# Patient Record
Sex: MALE | Race: WHITE | Employment: FULL TIME | URBAN - METROPOLITAN AREA
[De-identification: names, ages, dates, MRNs, and addresses within clinical notes are randomized per-mention and may not be internally consistent; named-entity substitution may affect disease eponyms.]

---

## 2017-01-13 RX ORDER — METOPROLOL SUCCINATE 25 MG/1
TABLET, EXTENDED RELEASE ORAL
Qty: 90 TABLET | Refills: 0 | Status: SHIPPED | OUTPATIENT
Start: 2017-01-13 | End: 2017-04-25 | Stop reason: SDUPTHER

## 2017-01-25 RX ORDER — LISINOPRIL AND HYDROCHLOROTHIAZIDE 20; 12.5 MG/1; MG/1
TABLET ORAL
Qty: 90 TABLET | Refills: 0 | OUTPATIENT
Start: 2017-01-25

## 2017-01-26 RX ORDER — LISINOPRIL AND HYDROCHLOROTHIAZIDE 20; 12.5 MG/1; MG/1
TABLET ORAL
Qty: 90 TABLET | Refills: 0 | Status: SHIPPED | OUTPATIENT
Start: 2017-01-26 | End: 2017-04-25

## 2017-04-25 ENCOUNTER — OFFICE VISIT (OUTPATIENT)
Dept: INTERNAL MEDICINE CLINIC | Age: 42
End: 2017-04-25

## 2017-04-25 VITALS
SYSTOLIC BLOOD PRESSURE: 118 MMHG | HEIGHT: 74 IN | BODY MASS INDEX: 22.59 KG/M2 | HEART RATE: 72 BPM | WEIGHT: 176 LBS | DIASTOLIC BLOOD PRESSURE: 80 MMHG

## 2017-04-25 DIAGNOSIS — I10 ESSENTIAL HYPERTENSION: Primary | ICD-10-CM

## 2017-04-25 PROCEDURE — 99213 OFFICE O/P EST LOW 20 MIN: CPT | Performed by: INTERNAL MEDICINE

## 2017-04-25 RX ORDER — METOPROLOL SUCCINATE 25 MG/1
TABLET, EXTENDED RELEASE ORAL
Qty: 90 TABLET | Refills: 0 | Status: SHIPPED | OUTPATIENT
Start: 2017-04-25 | End: 2017-08-04 | Stop reason: SDUPTHER

## 2017-04-25 ASSESSMENT — ENCOUNTER SYMPTOMS
CHEST TIGHTNESS: 0
VOMITING: 0
COUGH: 0
ABDOMINAL PAIN: 0
DIARRHEA: 0
BLOOD IN STOOL: 0
NAUSEA: 0
SHORTNESS OF BREATH: 0
WHEEZING: 0

## 2017-08-04 RX ORDER — METOPROLOL SUCCINATE 25 MG/1
TABLET, EXTENDED RELEASE ORAL
Qty: 90 TABLET | Refills: 0 | Status: SHIPPED | OUTPATIENT
Start: 2017-08-04 | End: 2018-05-14

## 2017-11-02 ENCOUNTER — OFFICE VISIT (OUTPATIENT)
Dept: INTERNAL MEDICINE CLINIC | Age: 42
End: 2017-11-02

## 2017-11-02 VITALS
BODY MASS INDEX: 21.94 KG/M2 | DIASTOLIC BLOOD PRESSURE: 82 MMHG | WEIGHT: 171 LBS | SYSTOLIC BLOOD PRESSURE: 136 MMHG | HEIGHT: 74 IN | HEART RATE: 76 BPM

## 2017-11-02 DIAGNOSIS — I10 ESSENTIAL HYPERTENSION: Primary | ICD-10-CM

## 2017-11-02 DIAGNOSIS — I10 ESSENTIAL HYPERTENSION: ICD-10-CM

## 2017-11-02 DIAGNOSIS — Z23 NEED FOR INFLUENZA VACCINATION: ICD-10-CM

## 2017-11-02 LAB
BASOPHILS ABSOLUTE: 0.1 K/UL (ref 0–0.2)
BASOPHILS RELATIVE PERCENT: 1.1 %
EOSINOPHILS ABSOLUTE: 0.1 K/UL (ref 0–0.6)
EOSINOPHILS RELATIVE PERCENT: 1.1 %
HCT VFR BLD CALC: 43.3 % (ref 40.5–52.5)
HEMOGLOBIN: 15.2 G/DL (ref 13.5–17.5)
LYMPHOCYTES ABSOLUTE: 1.2 K/UL (ref 1–5.1)
LYMPHOCYTES RELATIVE PERCENT: 18.4 %
MCH RBC QN AUTO: 38.6 PG (ref 26–34)
MCHC RBC AUTO-ENTMCNC: 35.1 G/DL (ref 31–36)
MCV RBC AUTO: 109.9 FL (ref 80–100)
MONOCYTES ABSOLUTE: 0.8 K/UL (ref 0–1.3)
MONOCYTES RELATIVE PERCENT: 11.6 %
NEUTROPHILS ABSOLUTE: 4.4 K/UL (ref 1.7–7.7)
NEUTROPHILS RELATIVE PERCENT: 67.8 %
PDW BLD-RTO: 13.2 % (ref 12.4–15.4)
PLATELET # BLD: 157 K/UL (ref 135–450)
PMV BLD AUTO: 9.5 FL (ref 5–10.5)
RBC # BLD: 3.94 M/UL (ref 4.2–5.9)
WBC # BLD: 6.5 K/UL (ref 4–11)

## 2017-11-02 PROCEDURE — 90658 IIV3 VACCINE SPLT 0.5 ML IM: CPT | Performed by: INTERNAL MEDICINE

## 2017-11-02 PROCEDURE — 90471 IMMUNIZATION ADMIN: CPT | Performed by: INTERNAL MEDICINE

## 2017-11-02 PROCEDURE — 99213 OFFICE O/P EST LOW 20 MIN: CPT | Performed by: INTERNAL MEDICINE

## 2017-11-02 RX ORDER — METOPROLOL SUCCINATE 25 MG/1
TABLET, EXTENDED RELEASE ORAL
Qty: 90 TABLET | Refills: 1 | Status: SHIPPED | OUTPATIENT
Start: 2017-11-02 | End: 2018-05-11 | Stop reason: SDUPTHER

## 2017-11-02 ASSESSMENT — ENCOUNTER SYMPTOMS
NAUSEA: 0
BLOOD IN STOOL: 0
SHORTNESS OF BREATH: 0
CHEST TIGHTNESS: 0
DIARRHEA: 0
VOMITING: 0
WHEEZING: 0
COUGH: 0
ABDOMINAL PAIN: 0

## 2017-11-03 LAB
ANION GAP SERPL CALCULATED.3IONS-SCNC: 18 MMOL/L (ref 3–16)
BUN BLDV-MCNC: 10 MG/DL (ref 7–20)
CALCIUM SERPL-MCNC: 9.9 MG/DL (ref 8.3–10.6)
CHLORIDE BLD-SCNC: 98 MMOL/L (ref 99–110)
CO2: 26 MMOL/L (ref 21–32)
CREAT SERPL-MCNC: 0.9 MG/DL (ref 0.9–1.3)
GFR AFRICAN AMERICAN: >60
GFR NON-AFRICAN AMERICAN: >60
GLUCOSE BLD-MCNC: 98 MG/DL (ref 70–99)
POTASSIUM SERPL-SCNC: 4.1 MMOL/L (ref 3.5–5.1)
SODIUM BLD-SCNC: 142 MMOL/L (ref 136–145)

## 2018-02-07 ENCOUNTER — OFFICE VISIT (OUTPATIENT)
Dept: INTERNAL MEDICINE CLINIC | Age: 43
End: 2018-02-07

## 2018-02-07 VITALS
HEIGHT: 74 IN | DIASTOLIC BLOOD PRESSURE: 80 MMHG | WEIGHT: 180 LBS | BODY MASS INDEX: 23.1 KG/M2 | SYSTOLIC BLOOD PRESSURE: 142 MMHG | HEART RATE: 76 BPM

## 2018-02-07 DIAGNOSIS — N52.9 ERECTILE DYSFUNCTION, UNSPECIFIED ERECTILE DYSFUNCTION TYPE: ICD-10-CM

## 2018-02-07 DIAGNOSIS — F17.218 CIGARETTE NICOTINE DEPENDENCE WITH OTHER NICOTINE-INDUCED DISORDER: ICD-10-CM

## 2018-02-07 DIAGNOSIS — I10 ESSENTIAL HYPERTENSION: Primary | ICD-10-CM

## 2018-02-07 PROCEDURE — 99213 OFFICE O/P EST LOW 20 MIN: CPT | Performed by: INTERNAL MEDICINE

## 2018-02-07 RX ORDER — SILDENAFIL 100 MG/1
100 TABLET, FILM COATED ORAL PRN
Qty: 6 TABLET | Refills: 2 | Status: SHIPPED | OUTPATIENT
Start: 2018-02-07 | End: 2018-06-15

## 2018-02-07 ASSESSMENT — ENCOUNTER SYMPTOMS
CHEST TIGHTNESS: 0
SHORTNESS OF BREATH: 0
NAUSEA: 0
ABDOMINAL PAIN: 0
WHEEZING: 0
DIARRHEA: 0
BLOOD IN STOOL: 0
VOMITING: 0
COUGH: 0

## 2018-02-21 RX ORDER — SILDENAFIL CITRATE 20 MG/1
20 TABLET ORAL PRN
Qty: 6 TABLET | Refills: 2 | Status: SHIPPED | OUTPATIENT
Start: 2018-02-21 | End: 2018-06-15

## 2018-05-14 RX ORDER — METOPROLOL SUCCINATE 25 MG/1
TABLET, EXTENDED RELEASE ORAL
Qty: 90 TABLET | Refills: 0 | Status: SHIPPED | OUTPATIENT
Start: 2018-05-14 | End: 2018-06-15 | Stop reason: SDUPTHER

## 2018-06-15 ENCOUNTER — OFFICE VISIT (OUTPATIENT)
Dept: INTERNAL MEDICINE CLINIC | Age: 43
End: 2018-06-15

## 2018-06-15 VITALS
HEART RATE: 100 BPM | HEIGHT: 74 IN | WEIGHT: 177 LBS | BODY MASS INDEX: 22.72 KG/M2 | DIASTOLIC BLOOD PRESSURE: 88 MMHG | SYSTOLIC BLOOD PRESSURE: 166 MMHG

## 2018-06-15 DIAGNOSIS — I10 ESSENTIAL HYPERTENSION: Primary | ICD-10-CM

## 2018-06-15 PROCEDURE — 99213 OFFICE O/P EST LOW 20 MIN: CPT | Performed by: INTERNAL MEDICINE

## 2018-06-15 RX ORDER — SILDENAFIL CITRATE 20 MG/1
20 TABLET ORAL PRN
Qty: 20 TABLET | Refills: 0 | Status: SHIPPED | OUTPATIENT
Start: 2018-06-15 | End: 2019-01-17 | Stop reason: SDUPTHER

## 2018-06-15 RX ORDER — METOPROLOL SUCCINATE 50 MG/1
TABLET, EXTENDED RELEASE ORAL
Qty: 30 TABLET | Refills: 2 | Status: SHIPPED | OUTPATIENT
Start: 2018-06-15 | End: 2019-01-08 | Stop reason: SDUPTHER

## 2018-06-15 ASSESSMENT — ENCOUNTER SYMPTOMS
BLOOD IN STOOL: 0
VOMITING: 0
CHEST TIGHTNESS: 0
DIARRHEA: 0
WHEEZING: 0
NAUSEA: 0
SHORTNESS OF BREATH: 0
ABDOMINAL PAIN: 0
COUGH: 0

## 2018-07-02 ENCOUNTER — OFFICE VISIT (OUTPATIENT)
Dept: INTERNAL MEDICINE CLINIC | Age: 43
End: 2018-07-02

## 2018-07-02 VITALS
WEIGHT: 182 LBS | BODY MASS INDEX: 23.36 KG/M2 | HEIGHT: 74 IN | DIASTOLIC BLOOD PRESSURE: 90 MMHG | SYSTOLIC BLOOD PRESSURE: 142 MMHG | HEART RATE: 88 BPM

## 2018-07-02 DIAGNOSIS — R59.0 CERVICAL LYMPHADENOPATHY: Primary | ICD-10-CM

## 2018-07-02 DIAGNOSIS — I10 ESSENTIAL HYPERTENSION: ICD-10-CM

## 2018-07-02 DIAGNOSIS — Z01.818 PRE-OPERATIVE GENERAL PHYSICAL EXAMINATION: ICD-10-CM

## 2018-07-02 DIAGNOSIS — C10.9 OROPHARYNGEAL CANCER (HCC): ICD-10-CM

## 2018-07-02 PROCEDURE — 99243 OFF/OP CNSLTJ NEW/EST LOW 30: CPT | Performed by: INTERNAL MEDICINE

## 2018-07-02 NOTE — PROGRESS NOTES
Subjective: Yoan Merrill is a 37 y.o. male who presents to the office today for a preoperative consultation at the request of surgeon Dr. Teresita Araujo  who plans on performing right cervical LN biopsy . Planned anesthesia is general.       Past Medical History:   Diagnosis Date    Cancer ([address]     lymphoma    Hypertension     Neoplasm, soft palate      History reviewed. No pertinent surgical history. Social History     Social History    Marital status: Single     Spouse name: N/A    Number of children: N/A    Years of education: N/A     Social History Main Topics    Smoking status: Current Every Day Smoker     Packs/day: 1.00     Years: 15.00     Types: Cigarettes    Smokeless tobacco: Never Used    Alcohol use 2.4 oz/week     4 Cans of beer per week      Comment: daily    Drug use: No    Sexual activity: Not Asked     Other Topics Concern    None     Social History Narrative    None     Current Outpatient Prescriptions   Medication Sig Dispense Refill    metoprolol succinate (TOPROL XL) 50 MG extended release tablet TAKE ONE TABLET BY MOUTH DAILY 30 tablet 2    sildenafil (REVATIO) 20 MG tablet Take 1 tablet by mouth as needed (ED) 20 tablet 0    varenicline (CHANTIX STARTING MONTH PAK) 0.5 MG X 11 & 1 MG X 42 tablet Take by mouth. 1 each 2     No current facility-administered medications for this visit.       No Known Allergies  Review of Systems  Constitutional: negative for fatigue and fevers  Eyes: negative for icterus and irritation  Ears, nose, mouth, throat, and face: negative for nasal congestion  Respiratory: negative for cough, shortness of breath and wheezing  Cardiovascular: negative for chest pain, irregular heart beat and palpitations  Gastrointestinal: negative for abdominal pain, diarrhea, nausea and vomiting  Genitourinary:negative for dysuria and hematuria     Planned anesthesia: general  Known anesthesia problems: none  Bleeding risk:  low  Personal or FH of DVT/PE: no  Patient objection to receiving blood products: no     Objective:      BP (!) 142/90   Pulse 88   Ht 6' 2\" (1.88 m)   Wt 182 lb (82.6 kg)   BMI 23.37 kg/m²     General Appearance:  Alert, cooperative, no distress, appears stated age   Head:  Normocephalic, without obvious abnormality, atraumatic   Eyes:  PERRL, conjunctiva/corneas clear, EOM's intact   Ears:  Normal TM's and external ear canals, both ears       Throat: Lips, mucosa, and tongue normal; teeth and gums normal   Neck: Supple, symmetrical, trachea midline, no adenopathy, thyroid: not enlarged, symmetric, no tenderness/mass/nodules, no carotid bruit or JVD       Lungs:   Clear to auscultation bilaterally, respirations unlabored   Chest Wall:  No tenderness or deformity   Heart:  Regular rate and rhythm, S1, S2 normal, no murmur, rub or gallop   Abdomen:   Soft, non-tender, bowel sounds active all four quadrants,  no masses, no organomegaly           Extremities: Extremities normal, atraumatic, no cyanosis or edema   Pulses: 2+ and symmetric   Skin: Skin color, texture, turgor normal, no rashes or lesions                        Assessment:       Diagnosis Orders   1. Cervical lymphadenopathy     2. Oropharyngeal cancer ([address]     3. Pre-operative general physical examination     4. Essential hypertension           37 y.o. male with planned surgery as above. Plan:   HTN. Stable. Continue toprol. Patient medically cleared for above planned procedure.

## 2018-07-10 LAB
ALT SERPL-CCNC: 12 U/L (ref 0–60)
AST SERPL-CCNC: 20 U/L (ref 0–40)
HEPATITIS B CORE IGM ANTIBODY: NONREACTIVE
HEPATITIS B SURFACE ANTIGEN: NONREACTIVE
HEPATITIS C GENOTYPE: NONREACTIVE
HEPATITIS C VIRUS AB SIGNAL/CU: 0.15 S/CO (ref 0–0.79)
HIV-1 AND HIV-2 ANTIBODIES: NONREACTIVE

## 2018-07-13 ENCOUNTER — TELEPHONE (OUTPATIENT)
Dept: INTERNAL MEDICINE CLINIC | Age: 43
End: 2018-07-13

## 2018-07-13 NOTE — TELEPHONE ENCOUNTER
----- Message from Jaime Louis MD sent at 7/13/2018  3:44 PM EDT -----  He should atleast take the 25 mg one daily   ----- Message -----  From: Ilya Plummer  Sent: 7/13/2018   3:28 PM  To: Jaime Louis MD    You upped  His bp med. He stopped smokin 1 week ago and bp has been ranging around 123/76.   Do you still want him to take his pills, he hasnt taken any for 3 days

## 2019-01-09 RX ORDER — METOPROLOL SUCCINATE 50 MG/1
TABLET, EXTENDED RELEASE ORAL
Qty: 30 TABLET | Refills: 0 | Status: SHIPPED | OUTPATIENT
Start: 2019-01-09 | End: 2019-02-27 | Stop reason: SDUPTHER

## 2019-01-17 ENCOUNTER — OFFICE VISIT (OUTPATIENT)
Dept: INTERNAL MEDICINE CLINIC | Age: 44
End: 2019-01-17

## 2019-01-17 VITALS
HEART RATE: 96 BPM | SYSTOLIC BLOOD PRESSURE: 122 MMHG | HEIGHT: 75 IN | WEIGHT: 192 LBS | BODY MASS INDEX: 23.87 KG/M2 | DIASTOLIC BLOOD PRESSURE: 80 MMHG

## 2019-01-17 DIAGNOSIS — I10 ESSENTIAL HYPERTENSION: Primary | ICD-10-CM

## 2019-01-17 PROCEDURE — 99213 OFFICE O/P EST LOW 20 MIN: CPT | Performed by: INTERNAL MEDICINE

## 2019-01-17 RX ORDER — SILDENAFIL CITRATE 20 MG/1
20 TABLET ORAL PRN
Qty: 20 TABLET | Refills: 2 | Status: SHIPPED | OUTPATIENT
Start: 2019-01-17 | End: 2019-10-18 | Stop reason: SDUPTHER

## 2019-01-17 ASSESSMENT — PATIENT HEALTH QUESTIONNAIRE - PHQ9
2. FEELING DOWN, DEPRESSED OR HOPELESS: 0
SUM OF ALL RESPONSES TO PHQ QUESTIONS 1-9: 0
SUM OF ALL RESPONSES TO PHQ9 QUESTIONS 1 & 2: 0
1. LITTLE INTEREST OR PLEASURE IN DOING THINGS: 0
SUM OF ALL RESPONSES TO PHQ QUESTIONS 1-9: 0

## 2019-01-17 ASSESSMENT — ENCOUNTER SYMPTOMS
WHEEZING: 0
BLOOD IN STOOL: 0
CHEST TIGHTNESS: 0
NAUSEA: 0
COUGH: 0
VOMITING: 0
DIARRHEA: 0
SHORTNESS OF BREATH: 0
ABDOMINAL PAIN: 0

## 2019-02-28 RX ORDER — METOPROLOL SUCCINATE 50 MG/1
TABLET, EXTENDED RELEASE ORAL
Qty: 30 TABLET | Refills: 2 | Status: SHIPPED | OUTPATIENT
Start: 2019-02-28 | End: 2019-08-26 | Stop reason: SDUPTHER

## 2019-08-27 RX ORDER — METOPROLOL SUCCINATE 50 MG/1
TABLET, EXTENDED RELEASE ORAL
Qty: 30 TABLET | Refills: 2 | Status: SHIPPED | OUTPATIENT
Start: 2019-08-27 | End: 2019-10-18 | Stop reason: SDUPTHER

## 2019-10-18 ENCOUNTER — OFFICE VISIT (OUTPATIENT)
Dept: INTERNAL MEDICINE CLINIC | Age: 44
End: 2019-10-18

## 2019-10-18 VITALS
WEIGHT: 186 LBS | HEART RATE: 88 BPM | HEIGHT: 74 IN | DIASTOLIC BLOOD PRESSURE: 90 MMHG | SYSTOLIC BLOOD PRESSURE: 138 MMHG | BODY MASS INDEX: 23.87 KG/M2

## 2019-10-18 DIAGNOSIS — I10 ESSENTIAL HYPERTENSION: Primary | ICD-10-CM

## 2019-10-18 DIAGNOSIS — I10 ESSENTIAL HYPERTENSION: ICD-10-CM

## 2019-10-18 DIAGNOSIS — Z23 NEED FOR INFLUENZA VACCINATION: ICD-10-CM

## 2019-10-18 LAB
ANION GAP SERPL CALCULATED.3IONS-SCNC: 14 MMOL/L (ref 3–16)
BASOPHILS ABSOLUTE: 0.1 K/UL (ref 0–0.2)
BASOPHILS RELATIVE PERCENT: 1.1 %
BUN BLDV-MCNC: 13 MG/DL (ref 7–20)
CALCIUM SERPL-MCNC: 9.5 MG/DL (ref 8.3–10.6)
CHLORIDE BLD-SCNC: 101 MMOL/L (ref 99–110)
CO2: 23 MMOL/L (ref 21–32)
CREAT SERPL-MCNC: 1 MG/DL (ref 0.9–1.3)
EOSINOPHILS ABSOLUTE: 0.1 K/UL (ref 0–0.6)
EOSINOPHILS RELATIVE PERCENT: 1.9 %
GFR AFRICAN AMERICAN: >60
GFR NON-AFRICAN AMERICAN: >60
GLUCOSE BLD-MCNC: 96 MG/DL (ref 70–99)
HCT VFR BLD CALC: 43 % (ref 40.5–52.5)
HEMOGLOBIN: 15.1 G/DL (ref 13.5–17.5)
LYMPHOCYTES ABSOLUTE: 1.4 K/UL (ref 1–5.1)
LYMPHOCYTES RELATIVE PERCENT: 20.3 %
MCH RBC QN AUTO: 37.4 PG (ref 26–34)
MCHC RBC AUTO-ENTMCNC: 35.1 G/DL (ref 31–36)
MCV RBC AUTO: 106.3 FL (ref 80–100)
MONOCYTES ABSOLUTE: 0.9 K/UL (ref 0–1.3)
MONOCYTES RELATIVE PERCENT: 13.4 %
NEUTROPHILS ABSOLUTE: 4.5 K/UL (ref 1.7–7.7)
NEUTROPHILS RELATIVE PERCENT: 63.3 %
PDW BLD-RTO: 13.1 % (ref 12.4–15.4)
PLATELET # BLD: 172 K/UL (ref 135–450)
PMV BLD AUTO: 9.2 FL (ref 5–10.5)
POTASSIUM SERPL-SCNC: 3.9 MMOL/L (ref 3.5–5.1)
RBC # BLD: 4.04 M/UL (ref 4.2–5.9)
SODIUM BLD-SCNC: 138 MMOL/L (ref 136–145)
WBC # BLD: 7 K/UL (ref 4–11)

## 2019-10-18 PROCEDURE — 99213 OFFICE O/P EST LOW 20 MIN: CPT | Performed by: INTERNAL MEDICINE

## 2019-10-18 PROCEDURE — 90471 IMMUNIZATION ADMIN: CPT | Performed by: INTERNAL MEDICINE

## 2019-10-18 PROCEDURE — 90682 RIV4 VACC RECOMBINANT DNA IM: CPT | Performed by: INTERNAL MEDICINE

## 2019-10-18 RX ORDER — METOPROLOL SUCCINATE 50 MG/1
TABLET, EXTENDED RELEASE ORAL
Qty: 30 TABLET | Refills: 2 | Status: CANCELLED | OUTPATIENT
Start: 2019-10-18

## 2019-10-18 RX ORDER — METOPROLOL SUCCINATE 50 MG/1
TABLET, EXTENDED RELEASE ORAL
Qty: 30 TABLET | Refills: 2 | Status: SHIPPED | OUTPATIENT
Start: 2019-10-18 | End: 2020-05-14 | Stop reason: SDUPTHER

## 2019-10-18 RX ORDER — SILDENAFIL CITRATE 20 MG/1
20 TABLET ORAL PRN
Qty: 20 TABLET | Refills: 2 | Status: CANCELLED | OUTPATIENT
Start: 2019-10-18

## 2019-10-18 RX ORDER — VARENICLINE TARTRATE 1 MG/1
1 TABLET, FILM COATED ORAL 2 TIMES DAILY
Qty: 60 TABLET | Refills: 1 | Status: SHIPPED | OUTPATIENT
Start: 2019-10-18 | End: 2021-05-06

## 2019-10-18 RX ORDER — VARENICLINE TARTRATE 25 MG
KIT ORAL
Qty: 1 BOX | Refills: 0 | Status: SHIPPED | OUTPATIENT
Start: 2019-10-18 | End: 2021-05-06

## 2019-10-18 RX ORDER — SILDENAFIL CITRATE 20 MG/1
20 TABLET ORAL PRN
Qty: 20 TABLET | Refills: 2 | Status: SHIPPED | OUTPATIENT
Start: 2019-10-18 | End: 2020-11-20 | Stop reason: SDUPTHER

## 2019-10-18 ASSESSMENT — ENCOUNTER SYMPTOMS
BLOOD IN STOOL: 0
COUGH: 0
SHORTNESS OF BREATH: 0
NAUSEA: 0
VOMITING: 0
CHEST TIGHTNESS: 0
DIARRHEA: 0
ABDOMINAL PAIN: 0
WHEEZING: 0

## 2020-05-14 ENCOUNTER — VIRTUAL VISIT (OUTPATIENT)
Dept: INTERNAL MEDICINE CLINIC | Age: 45
End: 2020-05-14

## 2020-05-14 PROCEDURE — 99213 OFFICE O/P EST LOW 20 MIN: CPT | Performed by: INTERNAL MEDICINE

## 2020-05-14 RX ORDER — METOPROLOL SUCCINATE 50 MG/1
TABLET, EXTENDED RELEASE ORAL
Qty: 30 TABLET | Refills: 2 | Status: SHIPPED | OUTPATIENT
Start: 2020-05-14 | End: 2020-08-20 | Stop reason: SDUPTHER

## 2020-05-14 ASSESSMENT — ENCOUNTER SYMPTOMS
SHORTNESS OF BREATH: 0
COUGH: 0
WHEEZING: 0
CHEST TIGHTNESS: 0
DIARRHEA: 0
BLOOD IN STOOL: 0
NAUSEA: 0
ABDOMINAL PAIN: 0
VOMITING: 0

## 2020-05-14 NOTE — PROGRESS NOTES
person/place/time [x]Able to follow commands                ASSESSMENT/PLAN:   Diagnosis Orders   1. Essential hypertension           HTN    Continue toprol XL 50 mg for now     Counselled to stop smoking. chantix   Stop alcohol. No follow-ups on file. Bina Acuna is a 53 y.o. male being evaluated by a Virtual Visit (video visit) encounter to address concerns as mentioned above. A caregiver was present when appropriate. Due to this being a TeleHealth encounter ([facility]), evaluation of the following organ systems was limited: Vitals/Constitutional/EENT/Resp/CV/GI//MS/Neuro/Skin/Heme-Lymph-Imm. Pursuant to the emergency declaration under the [address] [place] [address] authority and the [employer] and [employer] Act, this Virtual Visit was conducted with patient's (and/or legal guardian's) consent, to reduce the patient's risk of exposure to COVID-19 and provide necessary medical care. The patient (and/or legal guardian) has also been advised to contact this office for worsening conditions or problems, and seek emergency medical treatment and/or call 911 if deemed necessary. Patient identification was verified at the start of the visit: Yes    Total time spent on this encounter: Not billed by time    Services were provided through a video synchronous discussion virtually to substitute for in-person clinic visit. Patient and provider were located at their individual homes. --Mary Bonner MD on 5/14/2020 at 2:01 PM    An electronic signature was used to authenticate this note.

## 2020-08-20 RX ORDER — METOPROLOL SUCCINATE 50 MG/1
TABLET, EXTENDED RELEASE ORAL
Qty: 30 TABLET | Refills: 2 | Status: SHIPPED | OUTPATIENT
Start: 2020-08-20 | End: 2020-11-20 | Stop reason: SDUPTHER

## 2020-09-21 ENCOUNTER — VIRTUAL VISIT (OUTPATIENT)
Dept: INTERNAL MEDICINE CLINIC | Age: 45
End: 2020-09-21

## 2020-09-21 ASSESSMENT — ENCOUNTER SYMPTOMS
ABDOMINAL PAIN: 0
CHEST TIGHTNESS: 0
WHEEZING: 0
BLOOD IN STOOL: 0
NAUSEA: 0
DIARRHEA: 0
COUGH: 0
SHORTNESS OF BREATH: 0
VOMITING: 0

## 2020-09-21 NOTE — PROGRESS NOTES
2020    TELEHEALTH EVALUATION -- Audio/Visual (During [facility]- public health emergency)    HPI:    Karly Jc (:  1975) has requested an audio/video evaluation for the following concern(s):    Is here for follow up of HTN. Has been complaint with meds. Trying to cut down on tobacco and alcohol.     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 Negative for fatigue and fever. Respiratory: Negative for cough, chest tightness, shortness of breath and wheezing. Cardiovascular: Negative for chest pain and palpitations. Gastrointestinal: Negative for abdominal pain, blood in stool, diarrhea, nausea and vomiting. Prior to Visit Medications    Medication Sig Taking? Authorizing Provider   metoprolol succinate (TOPROL XL) 50 MG extended release tablet TAKE ONE TABLET BY MOUTH DAILY  Rod Garcia MD   sildenafil (REVATIO) 20 MG tablet Take 1 tablet by mouth as needed (ED)  Rod Garcia MD   varenicline (CHANTIX STARTING MONTH ) 0.5 MG X 11 & 1 MG X 42 tablet Take by mouth. Rod Garcia MD   varenicline (CHANTIX CONTINUING MONTH ) 1 MG tablet Take 1 tablet by mouth 2 times daily  Rod Garcia MD   varenicline (CHANTIX STARTING MONTH ) 0.5 MG X 11 & 1 MG X 42 tablet Take by mouth. Rod Garcia MD       Social History     Tobacco Use    Smoking status: Current Every Day Smoker     Packs/day: 1.00     Years: 15.00     Pack years: 15.00     Types: Cigarettes    Smokeless tobacco: Never Used   Substance Use Topics    Alcohol use: Yes     Alcohol/week: 4.0 standard drinks     Types: 4 Cans of beer per week     Comment: daily    Drug use:  No            PHYSICAL EXAMINATION:  Vital Signs: (As obtained by patient/caregiver or practitioner observation)      Constitutional: [x] Appears well-developed and well-nourished [] No apparent distress      [x] Abnormal-   Mental status  [x] Alert and awake  [x] Oriented to person/place/time []Able to follow commands        Pulmonary/Chest: [x] Respiratory effort normal.  [x] No visualized signs of difficulty breathing or respiratory distress                        Psychiatric:       [x] Normal Affect [x] No Hallucinations        [] Abnormal-     Other pertinent observable physical exam findings-     ASSESSMENT/PLAN:   Diagnosis Orders   1. Essential hypertension       HTN   stable   Continue toprol XL 50 mg for now     Counselled to stop smoking. using nicotine gum  Counseled to stop drinking  alcohol. No follow-ups on file. Tere Lao is a 50 y.o. male being evaluated by a Virtual Visit (video visit) encounter to address concerns as mentioned above. A caregiver was present when appropriate. Due to this being a TeleHealth encounter (During [facility]-93 public health emergency), evaluation of the following organ systems was limited: Vitals/Constitutional/EENT/Resp/CV/GI//MS/Neuro/Skin/Heme-Lymph-Imm. Pursuant to the emergency declaration under the [address] [place] [address] authority and the [place] and [employer] Act, this Virtual Visit was conducted with patient's (and/or legal guardian's) consent, to reduce the patient's risk of exposure to COVID-19 and provide necessary medical care. The patient (and/or legal guardian) has also been advised to contact this office for worsening conditions or problems, and seek emergency medical treatment and/or call 911 if deemed necessary. Patient identification was verified at the start of the visit: Yes    Total time spent on this encounter: Not billed by time    Services were provided through a video synchronous discussion virtually to substitute for in-person clinic visit. Patient and provider were located at their individual homes.     --Carol Ann Aggarwal MD on 9/21/2020 at 4:43 PM    An electronic signature was used to authenticate this note.

## 2020-11-20 RX ORDER — SILDENAFIL CITRATE 20 MG/1
20 TABLET ORAL PRN
Qty: 20 TABLET | Refills: 2 | Status: SHIPPED | OUTPATIENT
Start: 2020-11-20 | End: 2020-11-23

## 2020-11-20 RX ORDER — METOPROLOL SUCCINATE 50 MG/1
TABLET, EXTENDED RELEASE ORAL
Qty: 90 TABLET | Refills: 0 | Status: SHIPPED | OUTPATIENT
Start: 2020-11-20 | End: 2020-11-23

## 2021-02-24 ENCOUNTER — TELEPHONE (OUTPATIENT)
Dept: INTERNAL MEDICINE CLINIC | Age: 46
End: 2021-02-24

## 2021-02-24 RX ORDER — METOPROLOL SUCCINATE 50 MG/1
TABLET, EXTENDED RELEASE ORAL
Qty: 30 TABLET | Refills: 2 | Status: SHIPPED | OUTPATIENT
Start: 2021-02-24 | End: 2021-05-06 | Stop reason: SDUPTHER

## 2021-05-06 ENCOUNTER — OFFICE VISIT (OUTPATIENT)
Dept: INTERNAL MEDICINE CLINIC | Age: 46
End: 2021-05-06

## 2021-05-06 VITALS
TEMPERATURE: 98.5 F | SYSTOLIC BLOOD PRESSURE: 154 MMHG | DIASTOLIC BLOOD PRESSURE: 90 MMHG | WEIGHT: 184 LBS | HEART RATE: 96 BPM | BODY MASS INDEX: 23.61 KG/M2 | HEIGHT: 74 IN

## 2021-05-06 DIAGNOSIS — I10 ESSENTIAL HYPERTENSION: ICD-10-CM

## 2021-05-06 DIAGNOSIS — Z00.00 ROUTINE GENERAL MEDICAL EXAMINATION AT A HEALTH CARE FACILITY: Primary | ICD-10-CM

## 2021-05-06 PROCEDURE — 99396 PREV VISIT EST AGE 40-64: CPT | Performed by: INTERNAL MEDICINE

## 2021-05-06 RX ORDER — METOPROLOL SUCCINATE 50 MG/1
TABLET, EXTENDED RELEASE ORAL
Qty: 30 TABLET | Refills: 5 | Status: SHIPPED | OUTPATIENT
Start: 2021-05-06 | End: 2021-11-29

## 2021-05-06 ASSESSMENT — ENCOUNTER SYMPTOMS
VOMITING: 0
BLOOD IN STOOL: 0
NAUSEA: 0
SHORTNESS OF BREATH: 0
DIARRHEA: 0
COUGH: 0
ABDOMINAL PAIN: 0
CHEST TIGHTNESS: 0
WHEEZING: 0

## 2021-05-06 ASSESSMENT — PATIENT HEALTH QUESTIONNAIRE - PHQ9
SUM OF ALL RESPONSES TO PHQ9 QUESTIONS 1 & 2: 0
SUM OF ALL RESPONSES TO PHQ QUESTIONS 1-9: 0
SUM OF ALL RESPONSES TO PHQ QUESTIONS 1-9: 0
2. FEELING DOWN, DEPRESSED OR HOPELESS: 0

## 2021-05-06 NOTE — PROGRESS NOTES
Galen Pang (:  1975) is a 50 y.o. male,Established patient, here for evaluation of the following chief complaint(s): Annual Exam      ASSESSMENT/PLAN:   Diagnosis Orders   1. Routine general medical examination at a health care facility  Hemoglobin A1C    Comprehensive Metabolic Panel    CBC Auto Differential    Lipid Panel   2. Essential hypertension  Comprehensive Metabolic Panel       Physical done    HTN  bp is high today    bp is good at home  Continue toprol XL 50 mg for now     Counselled to stop smoking. using nicotine gum  Counseled to stop drinking  alcohol. Advised to follow up at [facility] for oral cancer     SUBJECTIVE/OBJECTIVE:  HPI  Is here for a physcial  Is here for follow up of HTN. Has been complaint with meds. Trying to cut down on tobacco and alcohol.     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 Negative for fatigue and fever. Respiratory: Negative for cough, chest tightness, shortness of breath and wheezing. Cardiovascular: Negative for chest pain and palpitations. Gastrointestinal: Negative for abdominal pain, blood in stool, diarrhea, nausea and vomiting. Genitourinary: Negative for dysuria and hematuria. Neurological: Negative for light-headedness and headaches. Physical Exam  Constitutional:       Appearance: He is well-developed. HENT:      Head: Normocephalic and atraumatic. Right Ear: Tympanic membrane, ear canal and external ear normal. There is no impacted cerumen. Left Ear: Tympanic membrane, ear canal and external ear normal. There is no impacted cerumen. Eyes:      Pupils: Pupils are equal, round, and reactive to light. Neck:      Musculoskeletal: Normal range of motion and neck supple. Thyroid: No thyromegaly. Cardiovascular:      Rate and Rhythm: Normal rate and regular rhythm. Heart sounds: Normal heart sounds. No murmur.  No friction rub. No gallop. Comments: No carotid bruit  Pulmonary:      Effort: Pulmonary effort is normal. No respiratory distress. Breath sounds: Normal breath sounds. No wheezing or rales. Chest:      Chest wall: No tenderness. Abdominal:      General: Bowel sounds are normal. There is no distension. Palpations: Abdomen is soft. There is no mass. Tenderness: There is no abdominal tenderness. There is no guarding or rebound. Neurological:      Mental Status: He is alert and oriented to person, place, and time. Psychiatric:         Mood and Affect: Mood normal.         Behavior: Behavior normal.         Thought Content: Thought content normal.         Judgment: Judgment normal.                 An electronic signature was used to authenticate this note.     --Wale Snowden MD

## 2021-05-07 ENCOUNTER — IMMUNIZATION (OUTPATIENT)
Dept: PRIMARY CARE CLINIC | Age: 46
End: 2021-05-07
Payer: COMMERCIAL

## 2021-05-07 DIAGNOSIS — Z00.00 ROUTINE GENERAL MEDICAL EXAMINATION AT A HEALTH CARE FACILITY: ICD-10-CM

## 2021-05-07 DIAGNOSIS — I10 ESSENTIAL HYPERTENSION: ICD-10-CM

## 2021-05-07 LAB
A/G RATIO: 1.5 (ref 1.1–2.2)
ALBUMIN SERPL-MCNC: 4.6 G/DL (ref 3.4–5)
ALP BLD-CCNC: 76 U/L (ref 40–129)
ALT SERPL-CCNC: 12 U/L (ref 10–40)
ANION GAP SERPL CALCULATED.3IONS-SCNC: 13 MMOL/L (ref 3–16)
AST SERPL-CCNC: 22 U/L (ref 15–37)
BASOPHILS ABSOLUTE: 0.1 K/UL (ref 0–0.2)
BASOPHILS RELATIVE PERCENT: 0.8 %
BILIRUB SERPL-MCNC: 0.6 MG/DL (ref 0–1)
BUN BLDV-MCNC: 14 MG/DL (ref 7–20)
CALCIUM SERPL-MCNC: 9.5 MG/DL (ref 8.3–10.6)
CHLORIDE BLD-SCNC: 95 MMOL/L (ref 99–110)
CHOLESTEROL, TOTAL: 197 MG/DL (ref 0–199)
CO2: 26 MMOL/L (ref 21–32)
CREAT SERPL-MCNC: 1.1 MG/DL (ref 0.9–1.3)
EOSINOPHILS ABSOLUTE: 0.1 K/UL (ref 0–0.6)
EOSINOPHILS RELATIVE PERCENT: 1.2 %
GFR AFRICAN AMERICAN: >60
GFR NON-AFRICAN AMERICAN: >60
GLOBULIN: 3 G/DL
GLUCOSE BLD-MCNC: 88 MG/DL (ref 70–99)
HCT VFR BLD CALC: 48.5 % (ref 40.5–52.5)
HDLC SERPL-MCNC: 87 MG/DL (ref 40–60)
HEMOGLOBIN: 16.9 G/DL (ref 13.5–17.5)
LDL CHOLESTEROL CALCULATED: 94 MG/DL
LYMPHOCYTES ABSOLUTE: 1.6 K/UL (ref 1–5.1)
LYMPHOCYTES RELATIVE PERCENT: 18.2 %
MCH RBC QN AUTO: 36.8 PG (ref 26–34)
MCHC RBC AUTO-ENTMCNC: 34.8 G/DL (ref 31–36)
MCV RBC AUTO: 105.6 FL (ref 80–100)
MONOCYTES ABSOLUTE: 1 K/UL (ref 0–1.3)
MONOCYTES RELATIVE PERCENT: 11 %
NEUTROPHILS ABSOLUTE: 6.2 K/UL (ref 1.7–7.7)
NEUTROPHILS RELATIVE PERCENT: 68.8 %
PDW BLD-RTO: 13.4 % (ref 12.4–15.4)
PLATELET # BLD: 183 K/UL (ref 135–450)
PMV BLD AUTO: 9.4 FL (ref 5–10.5)
POTASSIUM SERPL-SCNC: 4.3 MMOL/L (ref 3.5–5.1)
RBC # BLD: 4.59 M/UL (ref 4.2–5.9)
SODIUM BLD-SCNC: 134 MMOL/L (ref 136–145)
TOTAL PROTEIN: 7.6 G/DL (ref 6.4–8.2)
TRIGL SERPL-MCNC: 81 MG/DL (ref 0–150)
VLDLC SERPL CALC-MCNC: 16 MG/DL
WBC # BLD: 9 K/UL (ref 4–11)

## 2021-05-07 PROCEDURE — 91300 COVID-19, PFIZER VACCINE 30MCG/0.3ML DOSE: CPT | Performed by: FAMILY MEDICINE

## 2021-05-07 PROCEDURE — 0001A COVID-19, PFIZER VACCINE 30MCG/0.3ML DOSE: CPT | Performed by: FAMILY MEDICINE

## 2021-05-08 LAB
ESTIMATED AVERAGE GLUCOSE: 102.5 MG/DL
HBA1C MFR BLD: 5.2 %

## 2021-05-28 ENCOUNTER — IMMUNIZATION (OUTPATIENT)
Dept: PRIMARY CARE CLINIC | Age: 46
End: 2021-05-28
Payer: COMMERCIAL

## 2021-05-28 PROCEDURE — 0002A COVID-19, PFIZER VACCINE 30MCG/0.3ML DOSE: CPT | Performed by: FAMILY MEDICINE

## 2021-05-28 PROCEDURE — 91300 COVID-19, PFIZER VACCINE 30MCG/0.3ML DOSE: CPT | Performed by: FAMILY MEDICINE

## 2021-11-29 RX ORDER — METOPROLOL SUCCINATE 50 MG/1
TABLET, EXTENDED RELEASE ORAL
Qty: 30 TABLET | Refills: 0 | Status: SHIPPED | OUTPATIENT
Start: 2021-11-29 | End: 2021-12-30

## 2021-12-27 RX ORDER — SILDENAFIL CITRATE 20 MG/1
TABLET ORAL
Qty: 20 TABLET | Refills: 2 | Status: SHIPPED | OUTPATIENT
Start: 2021-12-27 | End: 2022-09-27 | Stop reason: SDUPTHER

## 2021-12-30 RX ORDER — METOPROLOL SUCCINATE 50 MG/1
TABLET, EXTENDED RELEASE ORAL
Qty: 30 TABLET | Refills: 0 | Status: SHIPPED | OUTPATIENT
Start: 2021-12-30 | End: 2022-01-31

## 2022-01-12 ENCOUNTER — OFFICE VISIT (OUTPATIENT)
Dept: INTERNAL MEDICINE CLINIC | Age: 47
End: 2022-01-12

## 2022-01-12 VITALS
HEIGHT: 74 IN | SYSTOLIC BLOOD PRESSURE: 158 MMHG | DIASTOLIC BLOOD PRESSURE: 88 MMHG | HEART RATE: 92 BPM | WEIGHT: 190 LBS | BODY MASS INDEX: 24.38 KG/M2

## 2022-01-12 DIAGNOSIS — I10 ESSENTIAL HYPERTENSION: Primary | ICD-10-CM

## 2022-01-12 PROCEDURE — 99213 OFFICE O/P EST LOW 20 MIN: CPT | Performed by: INTERNAL MEDICINE

## 2022-01-12 ASSESSMENT — ENCOUNTER SYMPTOMS
COUGH: 0
NAUSEA: 0
WHEEZING: 0
DIARRHEA: 0
ABDOMINAL PAIN: 0
SHORTNESS OF BREATH: 0
CHEST TIGHTNESS: 0
VOMITING: 0
BLOOD IN STOOL: 0

## 2022-08-08 RX ORDER — METOPROLOL SUCCINATE 50 MG/1
TABLET, EXTENDED RELEASE ORAL
Qty: 30 TABLET | Refills: 2 | Status: SHIPPED | OUTPATIENT
Start: 2022-08-08

## 2022-09-27 ENCOUNTER — OFFICE VISIT (OUTPATIENT)
Dept: INTERNAL MEDICINE CLINIC | Age: 47
End: 2022-09-27

## 2022-09-27 VITALS
BODY MASS INDEX: 23.74 KG/M2 | DIASTOLIC BLOOD PRESSURE: 92 MMHG | HEIGHT: 74 IN | SYSTOLIC BLOOD PRESSURE: 158 MMHG | WEIGHT: 185 LBS | HEART RATE: 84 BPM

## 2022-09-27 DIAGNOSIS — Z00.00 ENCOUNTER FOR WELL ADULT EXAM WITHOUT ABNORMAL FINDINGS: Primary | ICD-10-CM

## 2022-09-27 DIAGNOSIS — C85.80 DIFFUSE NON-HODGKIN'S LYMPHOMA (HCC): ICD-10-CM

## 2022-09-27 DIAGNOSIS — Z00.00 ROUTINE GENERAL MEDICAL EXAMINATION AT A HEALTH CARE FACILITY: ICD-10-CM

## 2022-09-27 DIAGNOSIS — I10 ESSENTIAL HYPERTENSION: ICD-10-CM

## 2022-09-27 PROCEDURE — 99396 PREV VISIT EST AGE 40-64: CPT | Performed by: INTERNAL MEDICINE

## 2022-09-27 RX ORDER — SILDENAFIL CITRATE 20 MG/1
TABLET ORAL
Qty: 20 TABLET | Refills: 5 | Status: SHIPPED | OUTPATIENT
Start: 2022-09-27

## 2022-09-27 ASSESSMENT — ENCOUNTER SYMPTOMS
BLOOD IN STOOL: 0
SHORTNESS OF BREATH: 0
VOMITING: 0
NAUSEA: 0
DIARRHEA: 0
WHEEZING: 0
ABDOMINAL PAIN: 0
CHEST TIGHTNESS: 0
COUGH: 0

## 2022-09-27 ASSESSMENT — PATIENT HEALTH QUESTIONNAIRE - PHQ9
SUM OF ALL RESPONSES TO PHQ QUESTIONS 1-9: 0
SUM OF ALL RESPONSES TO PHQ QUESTIONS 1-9: 0
SUM OF ALL RESPONSES TO PHQ9 QUESTIONS 1 & 2: 0
2. FEELING DOWN, DEPRESSED OR HOPELESS: 0
1. LITTLE INTEREST OR PLEASURE IN DOING THINGS: 0
SUM OF ALL RESPONSES TO PHQ QUESTIONS 1-9: 0
SUM OF ALL RESPONSES TO PHQ QUESTIONS 1-9: 0

## 2022-09-27 NOTE — PROGRESS NOTES
Well Adult Note  Name: Kitty Prabhakar Date: 2022   MRN: [id number] Sex: Male   Age: 50 y.o. Ethnicity: Non- / Non    : 1975 Race: White (non-)      Delfina Reyes is here for well adult exam.  History:    Is here for follow up of HTN. Has been complaint with meds. Trying to cut down on tobacco and alcohol. 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 Negative for fatigue and fever. Respiratory:  Negative for cough, chest tightness, shortness of breath and wheezing. Cardiovascular:  Negative for chest pain and palpitations. Gastrointestinal:  Negative for abdominal pain, blood in stool, diarrhea, nausea and vomiting. Genitourinary:  Negative for dysuria and hematuria. Neurological:  Negative for light-headedness and headaches. No Known Allergies      Prior to Visit Medications    Medication Sig Taking? Authorizing Provider   metoprolol succinate (TOPROL XL) 50 MG extended release tablet TAKE ONE TABLET BY MOUTH DAILY  Elaina Farias MD   sildenafil (REVATIO) 20 MG tablet TAKE ONE TABLET BY MOUTH AS NEEDED FOR ERECTILE DYSFUNCTION  Elaina Farias MD         Past Medical History:   Diagnosis Date    Cancer ([facility] [address]     lymphoma    Hypertension     Neoplasm, soft palate        History reviewed. No pertinent surgical history. Family History   Problem Relation Age of Onset    Cancer Father     Diabetes Father     High Blood Pressure Father        Social History     Tobacco Use    Smoking status: Every Day     Packs/day: 1.00     Years: 15.00     Pack years: 15.00     Types: Cigarettes    Smokeless tobacco: Never   Substance Use Topics    Alcohol use:  Yes     Alcohol/week: 4.0 standard drinks     Types: 4 Cans of beer per week     Comment: daily    Drug use: No       Objective BP (!) 168/90   Pulse 84   Ht 6' 2\" (1.88 m)   Wt 185 lb (83.9 kg)   BMI 23.75 kg/m²   Wt Readings from Last 3 Encounters:   09/27/22 185 lb (83.9 kg)   01/12/22 190 lb (86.2 kg)   05/06/21 184 lb (83.5 kg)     There were no vitals filed for this visit. Physical Exam  Constitutional:       Appearance: He is well-developed. HENT:      Head: Normocephalic and atraumatic. Right Ear: Tympanic membrane, ear canal and external ear normal. There is no impacted cerumen. Left Ear: Tympanic membrane, ear canal and external ear normal. There is no impacted cerumen. Eyes:      Pupils: Pupils are equal, round, and reactive to light. Neck:      Thyroid: No thyromegaly. Cardiovascular:      Rate and Rhythm: Normal rate and regular rhythm. Heart sounds: Normal heart sounds. No murmur heard. No friction rub. No gallop. Comments: No carotid bruit  Pulmonary:      Effort: Pulmonary effort is normal. No respiratory distress. Breath sounds: Normal breath sounds. No wheezing or rales. Chest:      Chest wall: No tenderness. Abdominal:      General: Bowel sounds are normal. There is no distension. Palpations: Abdomen is soft. There is no mass. Tenderness: There is no abdominal tenderness. There is no guarding or rebound. Musculoskeletal:      Cervical back: Normal range of motion and neck supple. Neurological:      Mental Status: He is alert and oriented to person, place, and time. Psychiatric:         Mood and Affect: Mood normal.         Behavior: Behavior normal.         Thought Content: Thought content normal.         Judgment: Judgment normal.         Assessment   Plan   1. Routine general medical examination at a health care facility  2. Essential hypertension  3.  Diffuse non-Hodgkin's lymphoma (HCC)     Complete physical done      HTN  bp is high today    bp is good at home  Continue toprol XL 50 mg for now  Monitor bp at home and bring readings at next visit Counselled to stop smoking. using nicotine gum  Counseled to stop drinking  alcohol. Schedule colonoscopy. FIT test given    Personalized Preventive Plan   Current Health Maintenance Status  Immunization History   Administered Date(s) Administered    COVID-19, PFIZER PURPLE top, DILUTE for use, (age 15 y+), 30mcg/0.3mL 05/07/2021, 05/28/2021    Influenza Virus Vaccine 12/18/2014    Influenza, FLUBLOK, (age 25 y+), PF, 0.5mL 10/18/2019    Influenza, Triv, 3 Years and older, IM (Afluria (5 yrs and older) 11/02/2017    Td, unspecified formulation 01/19/2012        Health Maintenance   Topic Date Due    Pneumococcal 0-64 years Vaccine (1 - PCV) Never done    DTaP/Tdap/Td vaccine (1 - Tdap) 01/20/2012    Colorectal Cancer Screen  Never done    COVID-19 Vaccine (3 - Pfizer risk series) 06/25/2021    Depression Screen  05/06/2022    Flu vaccine (1) 08/01/2022    Lipids  05/07/2026    Hepatitis C screen  Completed    HIV screen  Completed    Hepatitis A vaccine  Aged Out    Hepatitis B vaccine  Aged Out    Hib vaccine  Aged Out    Meningococcal (ACWY) vaccine  Aged Out     Recommendations for [facility] Due: see orders and patient instructions/AVS.    No follow-ups on file.

## 2022-11-10 RX ORDER — METOPROLOL SUCCINATE 50 MG/1
TABLET, EXTENDED RELEASE ORAL
Qty: 30 TABLET | Refills: 2 | Status: SHIPPED | OUTPATIENT
Start: 2022-11-10

## 2022-11-29 ENCOUNTER — TELEPHONE (OUTPATIENT)
Dept: INTERNAL MEDICINE CLINIC | Age: 47
End: 2022-11-29

## 2022-11-29 NOTE — TELEPHONE ENCOUNTER
----- Message from Britt Moreira MD sent at 11/29/2022  4:08 PM EST -----  Contact: Gerhardt Pu [phone]  1.40 tomorrow   ----- Message -----  From: Sandro Villar  Sent: 11/29/2022   3:18 PM EST  To: Britt Moreira MD    Can wait for Dr Jovi Grayson per Dr Anabell Lam  ----- Message -----  From: Catarina Garrison  Sent: 11/29/2022   3:01 PM EST  To: Trevin Deleon MD    Patient states he went to the dentist for a procedure and they wouldn't do it due to his blood pressure being so high. 171/105, and it wont go down. Please advise     [facility].  Bharat Robison

## 2022-11-30 ENCOUNTER — OFFICE VISIT (OUTPATIENT)
Dept: INTERNAL MEDICINE CLINIC | Age: 47
End: 2022-11-30

## 2022-11-30 VITALS
HEIGHT: 74 IN | DIASTOLIC BLOOD PRESSURE: 84 MMHG | SYSTOLIC BLOOD PRESSURE: 154 MMHG | BODY MASS INDEX: 23.87 KG/M2 | HEART RATE: 100 BPM | WEIGHT: 186 LBS

## 2022-11-30 DIAGNOSIS — I10 ESSENTIAL HYPERTENSION: Primary | ICD-10-CM

## 2022-11-30 PROCEDURE — 99213 OFFICE O/P EST LOW 20 MIN: CPT | Performed by: INTERNAL MEDICINE

## 2022-11-30 PROCEDURE — 3079F DIAST BP 80-89 MM HG: CPT | Performed by: INTERNAL MEDICINE

## 2022-11-30 PROCEDURE — 3074F SYST BP LT 130 MM HG: CPT | Performed by: INTERNAL MEDICINE

## 2022-11-30 RX ORDER — LOSARTAN POTASSIUM 50 MG/1
50 TABLET ORAL DAILY
Qty: 30 TABLET | Refills: 1 | Status: SHIPPED | OUTPATIENT
Start: 2022-11-30

## 2022-11-30 ASSESSMENT — ENCOUNTER SYMPTOMS
COUGH: 0
CHEST TIGHTNESS: 0
BLOOD IN STOOL: 0
WHEEZING: 0
ABDOMINAL PAIN: 0
VOMITING: 0
DIARRHEA: 0
SHORTNESS OF BREATH: 0
NAUSEA: 0

## 2022-11-30 NOTE — PROGRESS NOTES
Octavia Rnadall (:  1975) is a 50 y.o. male,Established patient, here for evaluation of the following chief complaint(s):  Follow-up         ASSESSMENT/PLAN:     Diagnosis Orders   1. Essential hypertension              HTN  bp is high  Continue toprol XL 50 mg for now  Add losartan 50 mg daily   Monitor bp at home and bring readings at next visit  Limit alcohol intake    Subjective   SUBJECTIVE/OBJECTIVE:  HPI  Bp readings were high at the dentis office  Taking metoprolol. Denies complaints    Even at home Bp readings are high   160/ 90-98    Review of Systems   Constitutional: Negative. Negative for fatigue and fever. Respiratory:  Negative for cough, chest tightness, shortness of breath and wheezing. Cardiovascular:  Negative for chest pain and palpitations. Gastrointestinal:  Negative for abdominal pain, blood in stool, diarrhea, nausea and vomiting. Objective   Physical Exam  Constitutional:       Appearance: He is well-developed. HENT:      Head: Normocephalic and atraumatic. Eyes:      Pupils: Pupils are equal, round, and reactive to light. Neck:      Thyroid: No thyromegaly. Cardiovascular:      Rate and Rhythm: Normal rate and regular rhythm. Heart sounds: Normal heart sounds. No murmur heard. No friction rub. No gallop. Comments: No carotid bruit  Pulmonary:      Effort: Pulmonary effort is normal. No respiratory distress. Breath sounds: Normal breath sounds. No wheezing or rales. Chest:      Chest wall: No tenderness. Musculoskeletal:      Cervical back: Normal range of motion and neck supple. Neurological:      Mental Status: He is alert and oriented to person, place, and time. An electronic signature was used to authenticate this note.     --Devante Slater MD

## 2023-01-06 ENCOUNTER — OFFICE VISIT (OUTPATIENT)
Dept: INTERNAL MEDICINE CLINIC | Age: 48
End: 2023-01-06

## 2023-01-06 VITALS
RESPIRATION RATE: 16 BRPM | HEIGHT: 75 IN | HEART RATE: 90 BPM | WEIGHT: 192 LBS | DIASTOLIC BLOOD PRESSURE: 100 MMHG | SYSTOLIC BLOOD PRESSURE: 180 MMHG | BODY MASS INDEX: 23.87 KG/M2

## 2023-01-06 DIAGNOSIS — I10 ESSENTIAL HYPERTENSION: Primary | ICD-10-CM

## 2023-01-06 PROCEDURE — 3077F SYST BP >= 140 MM HG: CPT | Performed by: NURSE PRACTITIONER

## 2023-01-06 PROCEDURE — 99213 OFFICE O/P EST LOW 20 MIN: CPT | Performed by: NURSE PRACTITIONER

## 2023-01-06 PROCEDURE — 3080F DIAST BP >= 90 MM HG: CPT | Performed by: NURSE PRACTITIONER

## 2023-01-06 RX ORDER — AMLODIPINE BESYLATE 5 MG/1
5 TABLET ORAL DAILY
Qty: 90 TABLET | Refills: 0 | Status: SHIPPED | OUTPATIENT
Start: 2023-01-06

## 2023-01-06 ASSESSMENT — ENCOUNTER SYMPTOMS
SHORTNESS OF BREATH: 0
CHEST TIGHTNESS: 1

## 2023-01-06 NOTE — PROGRESS NOTES
Chief Complaint:   Maurisio Overton is a 50 y.o. male who presents for c/o BP medications not working. HPI    He has not taken Losartan in 2 days. Not feeling right when taking it  He was taking it last time he went to the Dentist.  He has been to the Dentist 3 times and got sent home all 3 times due to his BP being elevated. 184/117;   188/112  173/11; HR 95  188/113    One of the appointments, he was given Valium prior to the visit and it did not help  It is never consistent    He reports his chest was feeling tight a couple days ago, stopped taking Losartan and it went away. Review of Systems  Review of Systems   Eyes:  Positive for visual disturbance (sometimes blurred vision when his BP is elevated). Respiratory:  Positive for chest tightness. Negative for shortness of breath. Cardiovascular:  Negative for chest pain. Neurological:  Negative for dizziness, light-headedness and headaches. Allergies  Patient has no known allergies. Vitals  BP (!) 180/100 (Site: Right Upper Arm, Position: Sitting)   Pulse 90   Resp 16   Ht 6' 3\" (1.905 m)   Wt 192 lb (87.1 kg)   BMI 24.00 kg/m²     Current Medications  Current Outpatient Medications   Medication Sig Dispense Refill    losartan (COZAAR) 50 MG tablet Take 1 tablet by mouth daily 30 tablet 1    metoprolol succinate (TOPROL XL) 50 MG extended release tablet TAKE ONE TABLET BY MOUTH DAILY 30 tablet 2    sildenafil (REVATIO) 20 MG tablet TAKE ONE TABLET BY MOUTH AS NEEDED FOR ERECTILE DYSFUNCTION 20 tablet 5     No current facility-administered medications for this visit.        Past Medical History  Past Medical History:   Diagnosis Date    Cancer ([facility] [address]     lymphoma    Hypertension     Neoplasm, soft palate        Social History  Social History     Socioeconomic History    Marital status: Single     Spouse name: Not on file    Number of children: Not on file    Years of education: Not on file    Highest education level: Not on file   Occupational History    Not on file   Tobacco Use    Smoking status: Every Day     Packs/day: 1.00     Years: 15.00     Pack years: 15.00     Types: Cigarettes    Smokeless tobacco: Never   Substance and Sexual Activity    Alcohol use: Yes     Alcohol/week: 4.0 standard drinks     Types: 4 Cans of beer per week     Comment: daily    Drug use: No    Sexual activity: Not on file   Other Topics Concern    Not on file   Social History Narrative    Not on file     Social Determinants of Health     Financial Resource Strain: Not on file   Food Insecurity: Not on file   Transportation Needs: Not on file   Physical Activity: Not on file   Stress: Not on file   Social Connections: Not on file   Intimate Partner Violence: Not on file   Housing Stability: Not on file       SurgicalHistory  No past surgical history on file. Physical Exam  Physical Exam  Constitutional:       Appearance: Normal appearance. HENT:      Head: Normocephalic and atraumatic. Eyes:      Conjunctiva/sclera: Conjunctivae normal.      Pupils: Pupils are equal, round, and reactive to light. Neck:      Vascular: No carotid bruit. Cardiovascular:      Rate and Rhythm: Normal rate and regular rhythm. Pulses: Normal pulses. Heart sounds: Normal heart sounds. No murmur heard. No friction rub. No gallop. Pulmonary:      Effort: Pulmonary effort is normal.      Breath sounds: Normal breath sounds. No wheezing, rhonchi or rales. Musculoskeletal:      Cervical back: Neck supple. Skin:     General: Skin is warm and dry. Capillary Refill: Capillary refill takes less than 2 seconds. Neurological:      General: No focal deficit present. Mental Status: He is alert and oriented to person, place, and time. Psychiatric:         Mood and Affect: Mood normal.         Behavior: Behavior normal.         Thought Content:  Thought content normal.         Judgment: Judgment normal.         Assessment and Plan  Hypertension  Elevated BP  -cont Losartan, Toprol-XL,   -add Amlodipine 5 mg daily  Return in 1 week to recheck BP    Casper Tamayo FNP-C  1/6/2023

## 2023-01-23 ENCOUNTER — OFFICE VISIT (OUTPATIENT)
Dept: INTERNAL MEDICINE CLINIC | Age: 48
End: 2023-01-23

## 2023-01-23 VITALS
RESPIRATION RATE: 14 BRPM | WEIGHT: 187 LBS | HEART RATE: 80 BPM | BODY MASS INDEX: 23.25 KG/M2 | DIASTOLIC BLOOD PRESSURE: 80 MMHG | SYSTOLIC BLOOD PRESSURE: 160 MMHG | HEIGHT: 75 IN

## 2023-01-23 DIAGNOSIS — I10 ESSENTIAL HYPERTENSION: Primary | ICD-10-CM

## 2023-01-23 PROCEDURE — 99212 OFFICE O/P EST SF 10 MIN: CPT | Performed by: NURSE PRACTITIONER

## 2023-01-23 PROCEDURE — 3079F DIAST BP 80-89 MM HG: CPT | Performed by: NURSE PRACTITIONER

## 2023-01-23 PROCEDURE — 3077F SYST BP >= 140 MM HG: CPT | Performed by: NURSE PRACTITIONER

## 2023-01-23 ASSESSMENT — ENCOUNTER SYMPTOMS: SHORTNESS OF BREATH: 0

## 2023-01-23 NOTE — PROGRESS NOTES
Chief Complaint:   Stephanie Dumont is a 50 y.o. male who presents for   Chief Complaint   Patient presents with    Hypertension        Hypertension  Pertinent negatives include no chest pain, headaches or shortness of breath. Patient presents to the office today for follow up on BP  He states it is improved from what it was   BP last night was 140/70 at home. Review of Systems  Review of Systems   Respiratory:  Negative for shortness of breath. Cardiovascular:  Negative for chest pain. Neurological:  Negative for dizziness and headaches. Allergies  Patient has no known allergies. Vitals  BP (!) 160/80 (Site: Left Upper Arm, Position: Sitting)   Pulse 80   Resp 14   Ht 6' 3\" (1.905 m)   Wt 187 lb (84.8 kg)   BMI 23.37 kg/m²     Current Medications  Current Outpatient Medications   Medication Sig Dispense Refill    amLODIPine (NORVASC) 5 MG tablet Take 1 tablet by mouth daily 90 tablet 0    losartan (COZAAR) 50 MG tablet Take 1 tablet by mouth daily 30 tablet 1    metoprolol succinate (TOPROL XL) 50 MG extended release tablet TAKE ONE TABLET BY MOUTH DAILY 30 tablet 2    sildenafil (REVATIO) 20 MG tablet TAKE ONE TABLET BY MOUTH AS NEEDED FOR ERECTILE DYSFUNCTION 20 tablet 5     No current facility-administered medications for this visit. Past Medical History  Past Medical History:   Diagnosis Date    Cancer ([facility] [address]     lymphoma    Hypertension     Neoplasm, soft palate        Social History  Social History     Socioeconomic History    Marital status: Single     Spouse name: Not on file    Number of children: Not on file    Years of education: Not on file    Highest education level: Not on file   Occupational History    Not on file   Tobacco Use    Smoking status: Every Day     Packs/day: 1.00     Years: 15.00     Pack years: 15.00     Types: Cigarettes    Smokeless tobacco: Never   Substance and Sexual Activity    Alcohol use:  Yes     Alcohol/week: 4.0 standard drinks     Types: 4 Cans of beer per week     Comment: daily    Drug use: No    Sexual activity: Not on file   Other Topics Concern    Not on file   Social History Narrative    Not on file     Social Determinants of Health     Financial Resource Strain: Not on file   Food Insecurity: Not on file   Transportation Needs: Not on file   Physical Activity: Not on file   Stress: Not on file   Social Connections: Not on file   Intimate Partner Violence: Not on file   Housing Stability: Not on file       SurgicalHistory  No past surgical history on file. Physical Exam  Physical Exam  Constitutional:       Appearance: Normal appearance. Eyes:      Conjunctiva/sclera: Conjunctivae normal.      Pupils: Pupils are equal, round, and reactive to light. Cardiovascular:      Rate and Rhythm: Normal rate and regular rhythm. Pulses: Normal pulses. Heart sounds: Normal heart sounds. No murmur heard. No friction rub. No gallop. Pulmonary:      Effort: Pulmonary effort is normal.      Breath sounds: Normal breath sounds. No wheezing, rhonchi or rales. Musculoskeletal:      Right lower leg: No edema. Left lower leg: No edema. Skin:     General: Skin is warm and dry. Capillary Refill: Capillary refill takes less than 2 seconds. Neurological:      General: No focal deficit present. Mental Status: He is alert and oriented to person, place, and time. Psychiatric:         Mood and Affect: Mood normal.         Behavior: Behavior normal.         Thought Content: Thought content normal.         Judgment: Judgment normal.         Assessment and Plan    Hypertension  -/80 in the office  -repeat same as above  -BP at home was 140/70.   This is much improved from prior  -continue Losartan, Amlodipine, Toprol-XL    Magdaline B[place]  1/23/2023

## 2023-01-30 RX ORDER — LOSARTAN POTASSIUM 50 MG/1
TABLET ORAL
Qty: 30 TABLET | Refills: 1 | Status: SHIPPED | OUTPATIENT
Start: 2023-01-30

## 2023-02-09 RX ORDER — METOPROLOL SUCCINATE 50 MG/1
TABLET, EXTENDED RELEASE ORAL
Qty: 30 TABLET | Refills: 2 | Status: SHIPPED | OUTPATIENT
Start: 2023-02-09

## 2023-03-22 ENCOUNTER — APPOINTMENT (OUTPATIENT)
Dept: CT IMAGING | Age: 48
End: 2023-03-22
Payer: COMMERCIAL

## 2023-03-22 ENCOUNTER — HOSPITAL ENCOUNTER (EMERGENCY)
Age: 48
Discharge: HOME | End: 2023-03-22
Attending: EMERGENCY MEDICINE
Payer: COMMERCIAL

## 2023-03-22 ENCOUNTER — APPOINTMENT (OUTPATIENT)
Dept: GENERAL RADIOLOGY | Age: 48
End: 2023-03-22
Payer: COMMERCIAL

## 2023-03-22 VITALS
WEIGHT: 180 LBS | TEMPERATURE: 98.4 F | HEART RATE: 88 BPM | OXYGEN SATURATION: 100 % | HEIGHT: 74 IN | DIASTOLIC BLOOD PRESSURE: 81 MMHG | SYSTOLIC BLOOD PRESSURE: 146 MMHG | BODY MASS INDEX: 23.1 KG/M2 | RESPIRATION RATE: 18 BRPM

## 2023-03-22 DIAGNOSIS — K40.90 UNILATERAL INGUINAL HERNIA WITHOUT OBSTRUCTION OR GANGRENE, RECURRENCE NOT SPECIFIED: ICD-10-CM

## 2023-03-22 DIAGNOSIS — R11.2 NAUSEA AND VOMITING, UNSPECIFIED VOMITING TYPE: Primary | ICD-10-CM

## 2023-03-22 LAB
ALBUMIN SERPL-MCNC: 4.2 G/DL (ref 3.4–5)
ALBUMIN/GLOB SERPL: 1.4 {RATIO} (ref 1.1–2.2)
ALP SERPL-CCNC: 76 U/L (ref 40–129)
ALT SERPL-CCNC: 11 U/L (ref 10–40)
ANION GAP SERPL CALCULATED.3IONS-SCNC: 14 MMOL/L (ref 3–16)
AST SERPL-CCNC: 17 U/L (ref 15–37)
BASOPHILS # BLD: 0.1 K/UL (ref 0–0.2)
BASOPHILS NFR BLD: 0.9 %
BILIRUB SERPL-MCNC: 0.8 MG/DL (ref 0–1)
BUN SERPL-MCNC: 10 MG/DL (ref 7–20)
CALCIUM SERPL-MCNC: 10.2 MG/DL (ref 8.3–10.6)
CHLORIDE SERPL-SCNC: 99 MMOL/L (ref 99–110)
CO2 SERPL-SCNC: 26 MMOL/L (ref 21–32)
CREAT SERPL-MCNC: 0.8 MG/DL (ref 0.9–1.3)
DEPRECATED RDW RBC AUTO: 14.3 % (ref 12.4–15.4)
EKG ATRIAL RATE: 103 BPM
EKG DIAGNOSIS: NORMAL
EKG P-R INTERVAL: 154 MS
EKG Q-T INTERVAL: 336 MS
EKG QRS DURATION: 96 MS
EKG QTC CALCULATION (BAZETT): 440 MS
EKG R AXIS: 135 DEGREES
EKG T AXIS: 65 DEGREES
EKG VENTRICULAR RATE: 103 BPM
EOSINOPHIL # BLD: 0.1 K/UL (ref 0–0.6)
EOSINOPHIL NFR BLD: 0.6 %
GFR SERPLBLD CREATININE-BSD FMLA CKD-EPI: >60 ML/MIN/{1.73_M2}
GLUCOSE SERPL-MCNC: 89 MG/DL (ref 70–99)
HCT VFR BLD AUTO: 51.4 % (ref 40.5–52.5)
HGB BLD-MCNC: 17.3 G/DL (ref 13.5–17.5)
LACTATE BLDV-SCNC: 0.2 MMOL/L (ref 0.4–1.9)
LIPASE SERPL-CCNC: 22 U/L (ref 13–60)
LYMPHOCYTES # BLD: 1.1 K/UL (ref 1–5.1)
LYMPHOCYTES NFR BLD: 11.2 %
MCH RBC QN AUTO: 36 PG (ref 26–34)
MCHC RBC AUTO-ENTMCNC: 33.6 G/DL (ref 31–36)
MCV RBC AUTO: 107 FL (ref 80–100)
MONOCYTES # BLD: 1 K/UL (ref 0–1.3)
MONOCYTES NFR BLD: 9.7 %
NEUTROPHILS # BLD: 7.7 K/UL (ref 1.7–7.7)
NEUTROPHILS NFR BLD: 77.6 %
PLATELET # BLD AUTO: 197 K/UL (ref 135–450)
PMV BLD AUTO: 8.6 FL (ref 5–10.5)
POTASSIUM SERPL-SCNC: 4 MMOL/L (ref 3.5–5.1)
PROT SERPL-MCNC: 7.2 G/DL (ref 6.4–8.2)
RBC # BLD AUTO: 4.81 M/UL (ref 4.2–5.9)
SODIUM SERPL-SCNC: 139 MMOL/L (ref 136–145)
TROPONIN T SERPL-MCNC: <0.01 NG/ML
WBC # BLD AUTO: 9.9 K/UL (ref 4–11)

## 2023-03-22 PROCEDURE — 84484 ASSAY OF TROPONIN QUANT: CPT

## 2023-03-22 PROCEDURE — 93010 ELECTROCARDIOGRAM REPORT: CPT | Performed by: INTERNAL MEDICINE

## 2023-03-22 PROCEDURE — 83605 ASSAY OF LACTIC ACID: CPT

## 2023-03-22 PROCEDURE — 71046 X-RAY EXAM CHEST 2 VIEWS: CPT

## 2023-03-22 PROCEDURE — 80053 COMPREHEN METABOLIC PANEL: CPT

## 2023-03-22 PROCEDURE — 74177 CT ABD & PELVIS W/CONTRAST: CPT

## 2023-03-22 PROCEDURE — 93005 ELECTROCARDIOGRAM TRACING: CPT | Performed by: REGISTERED NURSE

## 2023-03-22 PROCEDURE — 6360000002 HC RX W HCPCS: Performed by: REGISTERED NURSE

## 2023-03-22 PROCEDURE — 83690 ASSAY OF LIPASE: CPT

## 2023-03-22 PROCEDURE — 96374 THER/PROPH/DIAG INJ IV PUSH: CPT

## 2023-03-22 PROCEDURE — 6360000004 HC RX CONTRAST MEDICATION: Performed by: REGISTERED NURSE

## 2023-03-22 PROCEDURE — 85025 COMPLETE CBC W/AUTO DIFF WBC: CPT

## 2023-03-22 PROCEDURE — 2580000003 HC RX 258: Performed by: REGISTERED NURSE

## 2023-03-22 PROCEDURE — 99285 EMERGENCY DEPT VISIT HI MDM: CPT

## 2023-03-22 RX ORDER — ONDANSETRON 4 MG/1
4 TABLET, FILM COATED ORAL 3 TIMES DAILY PRN
Qty: 15 TABLET | Refills: 0 | Status: SHIPPED | OUTPATIENT
Start: 2023-03-22

## 2023-03-22 RX ORDER — 0.9 % SODIUM CHLORIDE 0.9 %
1000 INTRAVENOUS SOLUTION INTRAVENOUS ONCE
Status: COMPLETED | OUTPATIENT
Start: 2023-03-22 | End: 2023-03-22

## 2023-03-22 RX ORDER — ONDANSETRON 2 MG/ML
4 INJECTION INTRAMUSCULAR; INTRAVENOUS ONCE
Status: COMPLETED | OUTPATIENT
Start: 2023-03-22 | End: 2023-03-22

## 2023-03-22 RX ADMIN — IOPAMIDOL 75 ML: 755 INJECTION, SOLUTION INTRAVENOUS at 11:12

## 2023-03-22 RX ADMIN — SODIUM CHLORIDE 1000 ML: 9 INJECTION, SOLUTION INTRAVENOUS at 09:45

## 2023-03-22 RX ADMIN — ONDANSETRON 4 MG: 2 INJECTION INTRAMUSCULAR; INTRAVENOUS at 09:44

## 2023-03-22 ASSESSMENT — ENCOUNTER SYMPTOMS
SORE THROAT: 0
DIARRHEA: 0
ABDOMINAL PAIN: 1
COUGH: 0
CHEST TIGHTNESS: 0
RHINORRHEA: 0
ANAL BLEEDING: 0
ABDOMINAL DISTENTION: 0
NAUSEA: 1
VOMITING: 1
RECTAL PAIN: 0
CONSTIPATION: 0
BACK PAIN: 0
BLOOD IN STOOL: 0
SHORTNESS OF BREATH: 0

## 2023-03-22 NOTE — ED PROVIDER NOTES
weight based adjustment of the mA/kV was utilized to reduce the radiation dose to as low as reasonably achievable. COMPARISON: None. HISTORY: ORDERING SYSTEM PROVIDED HISTORY: abd pain with nausea x3 weeks intermittently. TECHNOLOGIST PROVIDED HISTORY: Additional Contrast?->None Reason for exam:->abd pain with nausea x3 weeks intermittently. Decision Support Exception - unselect if not a suspected or confirmed emergency medical condition->Emergency Medical Condition (MA) Reason for Exam: Abdominal pain intermittenly with nausea x3 weeks FINDINGS: Lower Chest: The lung bases are clear. A small hiatal hernia is noted. Organs: The liver, spleen, pancreas, adrenal glands and kidneys are unremarkable. GI/Bowel: There is no bowel obstruction. The appendix is within normal limits. Scattered diverticula involve the ascending colon without adjacent inflammation. Pelvis: The pelvic viscera are within normal limits. There is a moderate to large fat containing right inguinal hernia. Peritoneum/Retroperitoneum: There is no evidence of free fluid or adenopathy. Mild-to-moderate atherosclerosis involves the abdominal aorta and bilateral common iliac arteries. Bones/Soft Tissues: Degenerative changes involve the thoracolumbar spine and bilateral hips. Avascular necrosis involves the bilateral femoral heads without subchondral collapse. 1. No acute abnormality. MDM:   Patient presenting for evaluation of nausea and vomiting that has worsened over the past several days. CT scan without any obstructive process or gastric outlet type of process notable but certainly some type of gastric emptying disorder is considered versus other cause. He was given Zofran and fluids with improvement of symptoms. Patient p.o. challenged. Labs are unremarkable. At this time I felt that discharge home with continuation of antiemetic and close follow-up was appropriate.   Will have patient follow-up with PCP as well as given GI
with a small hiatal hernia noted. Liver, spleen, pancreas, adrenal gland and kidneys are unremarkable. No bowel obstruction. Appendix within normal limit with scattered diverticula involving the ascending colon without inflammation. Pelvic viscera are within normal limit. Moderate to large fat-containing right inguinal hernia. No evidence of free fluid, adenopathy. Endings of the hiatal hernia were discussed with the patient. Initial presenting tachycardia was resolving after receiving IV fluids. On reevaluation patient stated that he was feeling better after IV fluids. I discussed with him the findings of his radiology studies and laboratory imaging. I discussed the plan for discharge including following up with GI for the hernia as well as his primary care physician. He was given a prescription for Zofran for any associated nausea. He was provided with strict return precautions for the emergency department including but not limited to inability to tolerate p.o. of solids or liquids, worsening pain, fevers, any other new or worsening symptoms. Patient verbalized understanding of all discharge teaching was ultimately discharged in a stable condition with all questions answered. Is this patient to be included in the SEP-1 Core Measure due to severe sepsis or septic shock? No  Does not meet 2 SIRS criteria      Sepsis Criteria   Severe Sepsis Criteria   Septic Shock Criteria     Must be confirmed or suspected to move forward with diagnosis of sepsis. Must meet 2:    [] Temperature > 100.9 F (38.3 C)        or < 96.8 F (36 C)  [x] HR > 90  [] RR > 20  [] WBC > 12 or < 4 or 10% bands      AND:      [] Infection Confirmed or        Suspected.      Must meet 1:    [] Lactate > 2       or   [] Signs of Organ Dysfunction:    - SBP < 90 or MAP < 65  - Altered mental status  - Creatinine > 2 or increased from      baseline  - Urine Output < 0.5 ml/kg/hr  - Bilirubin > 2  - INR > 1.5 (not

## 2023-03-22 NOTE — ED NOTES
Discharge instructions reviewed, patient verbalizes understanding. Denies questions/concerns at this time. Patient ambulatory out of ED in stable condition with all belongings.        Hu Rothman, GUCCI  03/22/23 8976

## 2023-03-22 NOTE — DISCHARGE INSTRUCTIONS
AbnormalCT scan was endings but did identify a moderate large fat-containing inguinal hernia on the right side. I would follow-up with your primary care physician regarding this finding. Please follow-up with GI if your symptoms of nausea and vomiting continue.

## 2023-03-23 ENCOUNTER — OFFICE VISIT (OUTPATIENT)
Dept: INTERNAL MEDICINE CLINIC | Age: 48
End: 2023-03-23

## 2023-03-23 VITALS
BODY MASS INDEX: 21.3 KG/M2 | WEIGHT: 166 LBS | HEART RATE: 96 BPM | DIASTOLIC BLOOD PRESSURE: 80 MMHG | HEIGHT: 74 IN | SYSTOLIC BLOOD PRESSURE: 132 MMHG

## 2023-03-23 DIAGNOSIS — K52.9 ACUTE GASTROENTERITIS: Primary | ICD-10-CM

## 2023-03-23 DIAGNOSIS — I10 ESSENTIAL HYPERTENSION: ICD-10-CM

## 2023-03-23 PROCEDURE — 3075F SYST BP GE 130 - 139MM HG: CPT | Performed by: INTERNAL MEDICINE

## 2023-03-23 PROCEDURE — 3079F DIAST BP 80-89 MM HG: CPT | Performed by: INTERNAL MEDICINE

## 2023-03-23 PROCEDURE — 99213 OFFICE O/P EST LOW 20 MIN: CPT | Performed by: INTERNAL MEDICINE

## 2023-03-23 ASSESSMENT — ENCOUNTER SYMPTOMS
COUGH: 0
SHORTNESS OF BREATH: 0
VOMITING: 0
NAUSEA: 0
WHEEZING: 0
CHEST TIGHTNESS: 0
BLOOD IN STOOL: 0
ABDOMINAL PAIN: 0
DIARRHEA: 0

## 2023-04-05 RX ORDER — LOSARTAN POTASSIUM 50 MG/1
TABLET ORAL
Qty: 30 TABLET | Refills: 1 | Status: SHIPPED | OUTPATIENT
Start: 2023-04-05

## 2023-04-25 ENCOUNTER — HOSPITAL ENCOUNTER (OUTPATIENT)
Dept: CT IMAGING | Age: 48
Discharge: HOME | End: 2023-04-25

## 2023-04-25 DIAGNOSIS — C15.9 MALIGNANT NEOPLASM OF ESOPHAGUS, UNSPECIFIED LOCATION (HCC): ICD-10-CM

## 2023-04-26 ENCOUNTER — TELEPHONE (OUTPATIENT)
Dept: SURGERY | Age: 48
End: 2023-04-26

## 2023-04-26 ENCOUNTER — INITIAL CONSULT (OUTPATIENT)
Dept: SURGERY | Age: 48
End: 2023-04-26
Payer: COMMERCIAL

## 2023-04-26 VITALS
HEART RATE: 93 BPM | WEIGHT: 161 LBS | BODY MASS INDEX: 20.66 KG/M2 | DIASTOLIC BLOOD PRESSURE: 89 MMHG | SYSTOLIC BLOOD PRESSURE: 136 MMHG | HEIGHT: 74 IN

## 2023-04-26 DIAGNOSIS — C15.9 MALIGNANT NEOPLASM OF ESOPHAGUS, UNSPECIFIED LOCATION (HCC): Primary | ICD-10-CM

## 2023-04-26 PROCEDURE — 3074F SYST BP LT 130 MM HG: CPT | Performed by: SURGERY

## 2023-04-26 PROCEDURE — 99204 OFFICE O/P NEW MOD 45 MIN: CPT | Performed by: SURGERY

## 2023-04-26 PROCEDURE — 3079F DIAST BP 80-89 MM HG: CPT | Performed by: SURGERY

## 2023-04-26 NOTE — PROGRESS NOTES
Spouse name: Not on file    Number of children: Not on file    Years of education: Not on file    Highest education level: Not on file   Occupational History    Not on file   Tobacco Use    Smoking status: Every Day     Packs/day: 0.25     Years: 15.00     Pack years: 3.75     Types: Cigarettes    Smokeless tobacco: Never    Tobacco comments: With anxiety   Vaping Use    Vaping Use: Never used   Substance and Sexual Activity    Alcohol use: Not Currently    Drug use: No    Sexual activity: Not on file   Other Topics Concern    Not on file   Social History Narrative    Not on file     Social Determinants of Health     Financial Resource Strain: Not on file   Food Insecurity: Not on file   Transportation Needs: Not on file   Physical Activity: Not on file   Stress: Not on file   Social Connections: Not on file   Intimate Partner Violence: Not on file   Housing Stability: Not on file         Family History:        Problem Relation Age of Onset    Cancer Father     Diabetes Father     High Blood Pressure Father        REVIEW OF SYSTEMS:  CONSTITUTIONAL:  positive for  fatigue and weight loss  HEENT:  negative  RESPIRATORY:  negative  CARDIOVASCULAR:  negative  GASTROINTESTINAL:  negative except for dysphagia  GENITOURINARY:  negative  HEMATOLOGIC/LYMPHATIC:  negative  NEUROLOGICAL:  Negative  * All other ROS reviewed and negative.        PHYSICAL EXAM:  VITALS:  /89 (Site: Left Wrist, Position: Sitting, Cuff Size: Medium Adult)   Pulse 93   Ht 6' 2\" (1.88 m)   Wt 161 lb (73 kg)   BMI 20.67 kg/m²   24HR INTAKE/OUTPUT:    [unfilled]  [unfilled]      CONSTITUTIONAL:  alert, no apparent distress and normal weight  EYES:  PERRL, sclera clear  ENT:  Normocephalic,atraumatic, without obvious abnormality  NECK:  supple, symmetrical, trachea midline  LUNGS: Resp effort easy and unlabored,   CARDIOVASCULAR:  NO JVD, regular rate   ABDOMEN:  , normal bowel sounds, soft, non-distended, non-tender,

## 2023-04-27 ENCOUNTER — TELEPHONE (OUTPATIENT)
Dept: SURGERY | Age: 48
End: 2023-04-27

## 2023-04-27 NOTE — TELEPHONE ENCOUNTER
Patient is scheduled for j tube placement and port insertion on 5/3/23 at 9:45 am with an arrival time of 7:45 am with Dr. Zay Quinn, Surinder Wing after midnight abhishek before surgery - Patient will need  day of surgery - [facility] completed pre-op physical - patient understood and agreed with above noted.

## 2023-05-01 ENCOUNTER — TELEPHONE (OUTPATIENT)
Dept: SURGERY | Age: 48
End: 2023-05-01

## 2023-05-01 NOTE — TELEPHONE ENCOUNTER
Jelena from [facility] called regarding CPT code on patient, wanting to make sure this procedure is outpatient as it is listed as inpatient. [phone].  Direct line

## 2023-05-03 ENCOUNTER — TELEPHONE (OUTPATIENT)
Dept: SURGERY | Age: 48
End: 2023-05-03

## 2023-05-03 ENCOUNTER — ANESTHESIA (OUTPATIENT)
Dept: OPERATING ROOM | Age: 48
DRG: 357 | End: 2023-05-03
Payer: COMMERCIAL

## 2023-05-03 ENCOUNTER — APPOINTMENT (OUTPATIENT)
Dept: GENERAL RADIOLOGY | Age: 48
DRG: 357 | End: 2023-05-03
Attending: SURGERY
Payer: COMMERCIAL

## 2023-05-03 ENCOUNTER — ANESTHESIA EVENT (OUTPATIENT)
Dept: OPERATING ROOM | Age: 48
DRG: 357 | End: 2023-05-03
Payer: COMMERCIAL

## 2023-05-03 ENCOUNTER — HOSPITAL ENCOUNTER (INPATIENT)
Age: 48
LOS: 1 days | Discharge: HOME | DRG: 357 | End: 2023-05-03
Attending: SURGERY | Admitting: SURGERY
Payer: COMMERCIAL

## 2023-05-03 VITALS
DIASTOLIC BLOOD PRESSURE: 74 MMHG | HEIGHT: 74 IN | SYSTOLIC BLOOD PRESSURE: 119 MMHG | BODY MASS INDEX: 20.66 KG/M2 | TEMPERATURE: 97 F | OXYGEN SATURATION: 93 % | RESPIRATION RATE: 16 BRPM | HEART RATE: 81 BPM | WEIGHT: 161 LBS

## 2023-05-03 DIAGNOSIS — G89.18 POSTOPERATIVE PAIN: Primary | ICD-10-CM

## 2023-05-03 DIAGNOSIS — C15.9 MALIGNANT NEOPLASM OF ESOPHAGUS, UNSPECIFIED LOCATION (HCC): Primary | ICD-10-CM

## 2023-05-03 LAB
ANION GAP SERPL CALCULATED.3IONS-SCNC: 12 MMOL/L (ref 3–16)
BUN SERPL-MCNC: 17 MG/DL (ref 7–20)
CALCIUM SERPL-MCNC: 9.1 MG/DL (ref 8.3–10.6)
CHLORIDE SERPL-SCNC: 100 MMOL/L (ref 99–110)
CO2 SERPL-SCNC: 25 MMOL/L (ref 21–32)
CREAT SERPL-MCNC: 0.9 MG/DL (ref 0.9–1.3)
GFR SERPLBLD CREATININE-BSD FMLA CKD-EPI: >60 ML/MIN/{1.73_M2}
GLUCOSE SERPL-MCNC: 89 MG/DL (ref 70–99)
POTASSIUM SERPL-SCNC: 5.2 MMOL/L (ref 3.5–5.1)
SODIUM SERPL-SCNC: 137 MMOL/L (ref 136–145)

## 2023-05-03 PROCEDURE — 77001 FLUOROGUIDE FOR VEIN DEVICE: CPT

## 2023-05-03 PROCEDURE — 80048 BASIC METABOLIC PNL TOTAL CA: CPT

## 2023-05-03 PROCEDURE — 05H633Z INSERTION OF INFUSION DEVICE INTO LEFT SUBCLAVIAN VEIN, PERCUTANEOUS APPROACH: ICD-10-PCS | Performed by: SURGERY

## 2023-05-03 PROCEDURE — 6370000000 HC RX 637 (ALT 250 FOR IP): Performed by: ANESTHESIOLOGY

## 2023-05-03 PROCEDURE — 2580000003 HC RX 258: Performed by: SURGERY

## 2023-05-03 PROCEDURE — 2580000003 HC RX 258: Performed by: ANESTHESIOLOGY

## 2023-05-03 PROCEDURE — 3209999900 FLUORO FOR SURGICAL PROCEDURES

## 2023-05-03 PROCEDURE — 6360000002 HC RX W HCPCS: Performed by: NURSE ANESTHETIST, CERTIFIED REGISTERED

## 2023-05-03 PROCEDURE — 7100000011 HC PHASE II RECOVERY - ADDTL 15 MIN: Performed by: SURGERY

## 2023-05-03 PROCEDURE — 3700000000 HC ANESTHESIA ATTENDED CARE: Performed by: SURGERY

## 2023-05-03 PROCEDURE — 3600000019 HC SURGERY ROBOT ADDTL 15MIN: Performed by: SURGERY

## 2023-05-03 PROCEDURE — A4217 STERILE WATER/SALINE, 500 ML: HCPCS | Performed by: SURGERY

## 2023-05-03 PROCEDURE — S2900 ROBOTIC SURGICAL SYSTEM: HCPCS | Performed by: SURGERY

## 2023-05-03 PROCEDURE — 2500000003 HC RX 250 WO HCPCS: Performed by: SURGERY

## 2023-05-03 PROCEDURE — 7100000000 HC PACU RECOVERY - FIRST 15 MIN: Performed by: SURGERY

## 2023-05-03 PROCEDURE — 3700000001 HC ADD 15 MINUTES (ANESTHESIA): Performed by: SURGERY

## 2023-05-03 PROCEDURE — 8E0W3CZ ROBOTIC ASSISTED PROCEDURE OF TRUNK REGION, PERCUTANEOUS APPROACH: ICD-10-PCS | Performed by: SURGERY

## 2023-05-03 PROCEDURE — 2500000003 HC RX 250 WO HCPCS: Performed by: NURSE ANESTHETIST, CERTIFIED REGISTERED

## 2023-05-03 PROCEDURE — 7100000001 HC PACU RECOVERY - ADDTL 15 MIN: Performed by: SURGERY

## 2023-05-03 PROCEDURE — C1788 PORT, INDWELLING, IMP: HCPCS | Performed by: SURGERY

## 2023-05-03 PROCEDURE — 2709999900 HC NON-CHARGEABLE SUPPLY: Performed by: SURGERY

## 2023-05-03 PROCEDURE — 6360000002 HC RX W HCPCS: Performed by: SURGERY

## 2023-05-03 PROCEDURE — 0JH60WZ INSERTION OF TOTALLY IMPLANTABLE VASCULAR ACCESS DEVICE INTO CHEST SUBCUTANEOUS TISSUE AND FASCIA, OPEN APPROACH: ICD-10-PCS | Performed by: SURGERY

## 2023-05-03 PROCEDURE — 1200000000 HC SEMI PRIVATE

## 2023-05-03 PROCEDURE — B5171ZA FLUOROSCOPY OF LEFT SUBCLAVIAN VEIN USING LOW OSMOLAR CONTRAST, GUIDANCE: ICD-10-PCS | Performed by: SURGERY

## 2023-05-03 PROCEDURE — 0DHA3UZ INSERTION OF FEEDING DEVICE INTO JEJUNUM, PERCUTANEOUS APPROACH: ICD-10-PCS | Performed by: SURGERY

## 2023-05-03 PROCEDURE — 7100000010 HC PHASE II RECOVERY - FIRST 15 MIN: Performed by: SURGERY

## 2023-05-03 PROCEDURE — 3600000009 HC SURGERY ROBOT BASE: Performed by: SURGERY

## 2023-05-03 DEVICE — PORT INFUS SGL LUMN ATTCH POLYUR OPN END CATH 8FR POWERPRT: Type: IMPLANTABLE DEVICE | Site: CHEST | Status: FUNCTIONAL

## 2023-05-03 RX ORDER — LABETALOL HYDROCHLORIDE 5 MG/ML
INJECTION, SOLUTION INTRAVENOUS PRN
Status: DISCONTINUED | OUTPATIENT
Start: 2023-05-03 | End: 2023-05-03 | Stop reason: SDUPTHER

## 2023-05-03 RX ORDER — METRONIDAZOLE 500 MG/100ML
500 INJECTION, SOLUTION INTRAVENOUS ONCE
Status: COMPLETED | OUTPATIENT
Start: 2023-05-03 | End: 2023-05-03

## 2023-05-03 RX ORDER — SODIUM CHLORIDE 0.9 % (FLUSH) 0.9 %
5-40 SYRINGE (ML) INJECTION EVERY 12 HOURS SCHEDULED
Status: DISCONTINUED | OUTPATIENT
Start: 2023-05-03 | End: 2023-05-03 | Stop reason: HOSPADM

## 2023-05-03 RX ORDER — KETAMINE HYDROCHLORIDE 50 MG/ML
INJECTION, SOLUTION, CONCENTRATE INTRAMUSCULAR; INTRAVENOUS PRN
Status: DISCONTINUED | OUTPATIENT
Start: 2023-05-03 | End: 2023-05-03 | Stop reason: SDUPTHER

## 2023-05-03 RX ORDER — IPRATROPIUM BROMIDE AND ALBUTEROL SULFATE 2.5; .5 MG/3ML; MG/3ML
1 SOLUTION RESPIRATORY (INHALATION) ONCE
Status: COMPLETED | OUTPATIENT
Start: 2023-05-03 | End: 2023-05-03

## 2023-05-03 RX ORDER — DEXAMETHASONE SODIUM PHOSPHATE 10 MG/ML
INJECTION INTRAMUSCULAR; INTRAVENOUS PRN
Status: DISCONTINUED | OUTPATIENT
Start: 2023-05-03 | End: 2023-05-03 | Stop reason: SDUPTHER

## 2023-05-03 RX ORDER — SODIUM CHLORIDE 0.9 % (FLUSH) 0.9 %
5-40 SYRINGE (ML) INJECTION PRN
Status: DISCONTINUED | OUTPATIENT
Start: 2023-05-03 | End: 2023-05-03 | Stop reason: HOSPADM

## 2023-05-03 RX ORDER — LIDOCAINE HYDROCHLORIDE 10 MG/ML
0.3 INJECTION, SOLUTION EPIDURAL; INFILTRATION; INTRACAUDAL; PERINEURAL
Status: DISCONTINUED | OUTPATIENT
Start: 2023-05-03 | End: 2023-05-03 | Stop reason: HOSPADM

## 2023-05-03 RX ORDER — OXYCODONE HYDROCHLORIDE 5 MG/1
5-10 TABLET ORAL EVERY 6 HOURS PRN
Qty: 16 TABLET | Refills: 0 | Status: SHIPPED | OUTPATIENT
Start: 2023-05-03 | End: 2023-05-08

## 2023-05-03 RX ORDER — SODIUM CHLORIDE, SODIUM LACTATE, POTASSIUM CHLORIDE, CALCIUM CHLORIDE 600; 310; 30; 20 MG/100ML; MG/100ML; MG/100ML; MG/100ML
INJECTION, SOLUTION INTRAVENOUS CONTINUOUS
Status: DISCONTINUED | OUTPATIENT
Start: 2023-05-03 | End: 2023-05-03 | Stop reason: HOSPADM

## 2023-05-03 RX ORDER — SODIUM CHLORIDE 9 MG/ML
INJECTION, SOLUTION INTRAVENOUS PRN
Status: DISCONTINUED | OUTPATIENT
Start: 2023-05-03 | End: 2023-05-03 | Stop reason: HOSPADM

## 2023-05-03 RX ORDER — CEFAZOLIN SODIUM IN 0.9 % NACL 2 G/100 ML
2000 PLASTIC BAG, INJECTION (ML) INTRAVENOUS ONCE
Status: COMPLETED | OUTPATIENT
Start: 2023-05-03 | End: 2023-05-03

## 2023-05-03 RX ORDER — LIDOCAINE HYDROCHLORIDE 20 MG/ML
INJECTION, SOLUTION INFILTRATION; PERINEURAL PRN
Status: DISCONTINUED | OUTPATIENT
Start: 2023-05-03 | End: 2023-05-03 | Stop reason: SDUPTHER

## 2023-05-03 RX ORDER — OXYCODONE HYDROCHLORIDE 5 MG/1
10 TABLET ORAL PRN
Status: DISCONTINUED | OUTPATIENT
Start: 2023-05-03 | End: 2023-05-03 | Stop reason: HOSPADM

## 2023-05-03 RX ORDER — BUPIVACAINE HYDROCHLORIDE AND EPINEPHRINE 5; 5 MG/ML; UG/ML
INJECTION, SOLUTION PERINEURAL PRN
Status: DISCONTINUED | OUTPATIENT
Start: 2023-05-03 | End: 2023-05-03 | Stop reason: ALTCHOICE

## 2023-05-03 RX ORDER — BUPIVACAINE HYDROCHLORIDE AND EPINEPHRINE 5; 5 MG/ML; UG/ML
INJECTION, SOLUTION EPIDURAL; INTRACAUDAL; PERINEURAL PRN
Status: DISCONTINUED | OUTPATIENT
Start: 2023-05-03 | End: 2023-05-03 | Stop reason: ALTCHOICE

## 2023-05-03 RX ORDER — ROCURONIUM BROMIDE 10 MG/ML
INJECTION, SOLUTION INTRAVENOUS PRN
Status: DISCONTINUED | OUTPATIENT
Start: 2023-05-03 | End: 2023-05-03 | Stop reason: SDUPTHER

## 2023-05-03 RX ORDER — ONDANSETRON 2 MG/ML
4 INJECTION INTRAMUSCULAR; INTRAVENOUS EVERY 30 MIN PRN
Status: DISCONTINUED | OUTPATIENT
Start: 2023-05-03 | End: 2023-05-03 | Stop reason: HOSPADM

## 2023-05-03 RX ORDER — ONDANSETRON 2 MG/ML
INJECTION INTRAMUSCULAR; INTRAVENOUS PRN
Status: DISCONTINUED | OUTPATIENT
Start: 2023-05-03 | End: 2023-05-03 | Stop reason: SDUPTHER

## 2023-05-03 RX ORDER — FENTANYL CITRATE 50 UG/ML
INJECTION, SOLUTION INTRAMUSCULAR; INTRAVENOUS PRN
Status: DISCONTINUED | OUTPATIENT
Start: 2023-05-03 | End: 2023-05-03 | Stop reason: SDUPTHER

## 2023-05-03 RX ORDER — PROPOFOL 10 MG/ML
INJECTION, EMULSION INTRAVENOUS PRN
Status: DISCONTINUED | OUTPATIENT
Start: 2023-05-03 | End: 2023-05-03 | Stop reason: SDUPTHER

## 2023-05-03 RX ORDER — OXYCODONE HYDROCHLORIDE 5 MG/1
5 TABLET ORAL PRN
Status: DISCONTINUED | OUTPATIENT
Start: 2023-05-03 | End: 2023-05-03 | Stop reason: HOSPADM

## 2023-05-03 RX ADMIN — PROPOFOL 200 MG: 10 INJECTION, EMULSION INTRAVENOUS at 09:38

## 2023-05-03 RX ADMIN — KETAMINE HYDROCHLORIDE 20 MG: 50 INJECTION INTRAMUSCULAR; INTRAVENOUS at 10:34

## 2023-05-03 RX ADMIN — SODIUM CHLORIDE: 9 INJECTION, SOLUTION INTRAVENOUS at 11:23

## 2023-05-03 RX ADMIN — IPRATROPIUM BROMIDE AND ALBUTEROL SULFATE 1 AMPULE: .5; 3 SOLUTION RESPIRATORY (INHALATION) at 08:27

## 2023-05-03 RX ADMIN — SODIUM CHLORIDE: 9 INJECTION, SOLUTION INTRAVENOUS at 09:32

## 2023-05-03 RX ADMIN — LIDOCAINE HYDROCHLORIDE 80 MG: 20 INJECTION, SOLUTION INFILTRATION; PERINEURAL at 09:38

## 2023-05-03 RX ADMIN — ONDANSETRON 4 MG: 2 INJECTION INTRAMUSCULAR; INTRAVENOUS at 09:38

## 2023-05-03 RX ADMIN — DEXAMETHASONE SODIUM PHOSPHATE 10 MG: 10 INJECTION INTRAMUSCULAR; INTRAVENOUS at 09:38

## 2023-05-03 RX ADMIN — ROCURONIUM BROMIDE 50 MG: 10 SOLUTION INTRAVENOUS at 09:38

## 2023-05-03 RX ADMIN — LABETALOL HYDROCHLORIDE 5 MG: 5 INJECTION, SOLUTION INTRAVENOUS at 10:33

## 2023-05-03 RX ADMIN — ROCURONIUM BROMIDE 20 MG: 10 SOLUTION INTRAVENOUS at 10:21

## 2023-05-03 RX ADMIN — SUGAMMADEX 200 MG: 100 INJECTION, SOLUTION INTRAVENOUS at 11:27

## 2023-05-03 RX ADMIN — FENTANYL CITRATE 100 MCG: 50 INJECTION, SOLUTION INTRAMUSCULAR; INTRAVENOUS at 10:21

## 2023-05-03 RX ADMIN — FENTANYL CITRATE 100 MCG: 50 INJECTION, SOLUTION INTRAMUSCULAR; INTRAVENOUS at 09:38

## 2023-05-03 RX ADMIN — Medication 2000 MG: at 09:41

## 2023-05-03 RX ADMIN — METRONIDAZOLE 500 MG: 500 INJECTION, SOLUTION INTRAVENOUS at 09:41

## 2023-05-03 ASSESSMENT — PAIN SCALES - GENERAL
PAINLEVEL_OUTOF10: 0

## 2023-05-03 ASSESSMENT — LIFESTYLE VARIABLES: SMOKING_STATUS: 1

## 2023-05-03 NOTE — OP NOTE
Date of Surgery: 5/3/23    Preop Dx:  Esophageal Cancer, Malnutrition    Postop Dx:  Same    Procedure: Insertion of left subclavian Power Portacath     Robotic feeding jejunostomy tube insertion    Surgeon:  Merline Hoot    Assistant:      Anesthesia:  GETA    EBL:   <50ml    Specimen:  none    Complications: none    Drains/Lines:  As above    Indications:  49 yo needing access for chemotherapy and nutrition    Description:  Patient was given adequate description of the risks and rewards of the procedure, including bleeding, infection, injury to structures such as the lung and freely consented. They were given appropriate antibiotics and brought to the OR where GETA anesthesia was induced. Pt was placed in supine position. Prepped and draped in usual sterile fashion with rolled towel along spine. Patient placed in slight Trendelenberg position. Local anesthetic used to numb along left neck. Using ultrasound guidance left internal jugular vein accessed but wire would not pass. Local anesthetic then instilled along left clavicle. Left subclavian vein accessed with needle. Through this the wire was placed and under fluoroscopy found to be in SVC. Needle removed. Using local anesthetic, site of port pocket anesthetized and then created. Under fluoroscopic guidance the introducer/dilator was inserted over the wire and then the wire and dilator were removed. Through the introducer the catheter was inserted and the introducer stripped away. Under fluoroscopy the catheter was found to be in the distal SVC. It was cut at the appropriate length and connected to the port. The port was secured to the clavicopectoral fascia using 3-0 prolene suture. It was tested with heparinized saline and found to aspirate and flush freely. The port pocket was closed with interrupted 3-0 vicryl and running subcuticular 4-0 monocryl suture.      Next using optivew technique a 5mm trocar was inserted in right upper abdomen

## 2023-05-03 NOTE — TELEPHONE ENCOUNTER
Carmelina White with [facility] called and said that they don't have the staff to do this pt's care. So please fax referral to '[facility]' and also let them know what [employer] to be using for the formula for tube feed an supplies. Their phone # [phone] and fax # [phone]. Thank you!

## 2023-05-03 NOTE — PROGRESS NOTES
D: Patient here from [address] [place] [address] via stretcher, taken to bay 5 in PACU, all current drips, treatments, skin issues, and plan of care were reviewed by both RN's, patient transferred in stable condition, patient is awake, alert, and oriented x 4, denies pain at present, call light is in reach. A: Assessment completed and documented, discussed plan of care with patient who agreed.

## 2023-05-03 NOTE — DISCHARGE INSTRUCTIONS
[facility] SURGERY [facility] AND [place]    Asim Burns. Luci King M.D. [address] [address] [address] [place]                [address] Sw[address] M.D. [address] [facility] , [address]         [place], [address] [facility] Claudio Junior M.D                         [phone]          [facility] Jasper Shafer M.D. [address] [facility]      POST-OPERATIVE INSTRUCTION    Call the office to if you have questions or concerns. Call for follow up ion two weeks. You will have either white steri-strips and a water occlusive dressing or surgical glue closing your incisions. Leave clear bandage over feeding tube. Replace with gauze and tape if it falls off. You may shower. Wash incision gently, and pat dry. Do not rub your incisions. Do NOT drive while taking your narcotic pain medicine. You will have pain medicine ordered. Take as directed. Watch for signs of infection:    Fever over 100.5°     Excessive warmth or bright redness around your incisions   Leakage of bloody or cloudy fluid from you incisions ANESTHESIA DISCHARGE INSTRUCTIONS    You are under the influence of drugs- do not drink alcohol, drive a car, operate machinery(such as power tools, kitchen appliances, etc), sign legal documents, or make any important decisions for 24 hours (or while on pain medications). Children should not ride bikes or [place] or play on gym sets  for 24 hours after surgery. A responsible adult should be with you for 24 hours. Rest at home today- increase activity as tolerated. Progress slowly to a regular diet unless your physician has instructed you otherwise. Drink plenty of water.     CALL YOUR DOCTOR IF YOU:  Have moderate to severe nausea or vomiting AND are unable to hold down fluids or

## 2023-05-03 NOTE — ANESTHESIA POSTPROCEDURE EVALUATION
Department of Anesthesiology  Postprocedure Note    Patient: Rain Todd  MRN: [id number]  YOB: 1975  Date of evaluation: 5/3/2023      Procedure Summary     Date: 05/03/23 Room / Location: [address] [place] [address]    Anesthesia Start: Chong Panad Anesthesia Stop: 6923    Procedures:       ROBOTIC JEJUNOSTOMY TUBE PLACEMENT (Abdomen)      SURGICAL PORT PLACEMENT (Chest) Diagnosis:       Malignant neoplasm of esophagus, unspecified location [facility])      (ESOPHAGEAL CANCER)    Surgeons: Graham Davis MD Responsible Provider: Suzanne Chavez MD    Anesthesia Type: general ASA Status: 3          Anesthesia Type: No value filed.     Srinivasa Phase I: Srinivasa Score: 10    Srinivasa Phase II: Srinivasa Score: 10      Anesthesia Post Evaluation    Patient location during evaluation: PACU  Level of consciousness: awake and alert  Airway patency: patent  Nausea & Vomiting: no vomiting  Complications: no  Cardiovascular status: blood pressure returned to baseline  Respiratory status: acceptable  Hydration status: stable

## 2023-05-03 NOTE — PROGRESS NOTES
Patient admitted to [facility] in preparation for surgery, VSS. Consent confirmed. Unable to obtain two IVs, patient a difficult stick. 22g IV inserted into LAC, LR infusing. Belongings in locker 1. NPO since 2000.

## 2023-05-03 NOTE — H&P
I have reviewed the history and physical and examined the patient. I find no relevant changes. I have reviewed with the patient and/or family members, during the preoperative office visit the risks, benefits, and alternatives to the procedure.     Mihaela Lamar MD

## 2023-05-04 ENCOUNTER — TELEPHONE (OUTPATIENT)
Dept: SURGERY | Age: 48
End: 2023-05-04

## 2023-05-04 NOTE — TELEPHONE ENCOUNTER
Parisa at [facility] called and said they received a tube feed referral but they need a 'verbal ok' for tube feed recommendations. Please call her and thank you!

## 2023-05-30 RX ORDER — METOPROLOL SUCCINATE 50 MG/1
TABLET, EXTENDED RELEASE ORAL
Qty: 30 TABLET | Refills: 0 | Status: SHIPPED | OUTPATIENT
Start: 2023-05-30

## 2023-06-26 RX ORDER — METOPROLOL SUCCINATE 50 MG/1
TABLET, EXTENDED RELEASE ORAL
Qty: 30 TABLET | Refills: 1 | Status: SHIPPED | OUTPATIENT
Start: 2023-06-26

## 2023-06-30 ENCOUNTER — OFFICE VISIT (OUTPATIENT)
Dept: SURGERY | Age: 48
End: 2023-06-30

## 2023-06-30 VITALS
SYSTOLIC BLOOD PRESSURE: 106 MMHG | HEIGHT: 74 IN | WEIGHT: 150.2 LBS | HEART RATE: 116 BPM | BODY MASS INDEX: 19.28 KG/M2 | DIASTOLIC BLOOD PRESSURE: 60 MMHG

## 2023-06-30 DIAGNOSIS — T85.598A FEEDING TUBE DYSFUNCTION, INITIAL ENCOUNTER: Primary | ICD-10-CM

## 2023-08-09 ENCOUNTER — OFFICE VISIT (OUTPATIENT)
Dept: SURGERY | Age: 48
End: 2023-08-09

## 2023-08-09 DIAGNOSIS — T85.598A FEEDING TUBE DYSFUNCTION, INITIAL ENCOUNTER: Primary | ICD-10-CM

## 2023-08-09 PROCEDURE — 99024 POSTOP FOLLOW-UP VISIT: CPT | Performed by: SURGERY

## 2023-08-09 NOTE — PROGRESS NOTES
HPI: Nursing notes reviewed. Patient with suture coming loose at jtube. Still inplace and functional.    ROS:  10 point review of systems performed with pertinent positives in HPI    Phys:    Abd - soft, nontender, nondistended, jtube in place       Assesment: 47 yo s/p jejunostomy tube    Plan: 1. Resutured with nylon suture.

## 2023-10-26 ENCOUNTER — HOSPITAL ENCOUNTER (OUTPATIENT)
Dept: SPEECH THERAPY | Age: 48
Setting detail: THERAPIES SERIES
Discharge: HOME | End: 2023-10-26
Payer: COMMERCIAL

## 2023-10-26 PROCEDURE — 92526 ORAL FUNCTION THERAPY: CPT

## 2023-10-26 PROCEDURE — 92610 EVALUATE SWALLOWING FUNCTION: CPT

## 2023-10-26 NOTE — PROGRESS NOTES
issues if not already being addressed at this time. Functional Outcome:   National Outcomes Measurement System (NOMS):     NOMS - Swallowing  Note: In Levels 3-5, some patients may meet only one of the \"and/or\" criteria listed. If you have difficulty deciding on the most appropriate level for an individual, use dietary level as the most important criterion if the dietary level is the result of swallow function rather than dentition only. Dietary levels at [address] [place] [address] 6 and 7 should be judged only on swallow function, and any influence of poor dentition should be disregarded. []  Level 1 Individual is not able to swallow anything safely by mouth. All nutrition and hydration is received through non-oral means (e.g., nasogastric tube, PEG)    [x]  Level 2 Individual is not able to swallow safely by mouth for nutrition and hydration, but may take some consistency with consistent maximal cues in therapy only. Alternative method of feeding required    []  Level 3  Alternative method of feeding required as individual takes less than 50% of nutrition and hydration by mouth, and/or swallowing is safe with consistent use of moderate cues to use compensatory strategies and/or requires maximum diet restriction    []  Level 4 Swallowing is safe, but usually requires moderate cues to use compensatory strategies, and/or the individual has moderate diet restrictions and /or still requires tube feeding and/or oral supplements    []  Level 5 Swallowing is safe with minimal diet restriction and/or occasionally requires minimal cueing to use compensatory strategies. The individual may occasionally self-cue. All nutrition and hydration needs are met by mouth at mealtime    []  Level 6 Swallowing is safe, and the individual eats and drinks independently and may rarely require minimal cueing. The individual usually self-cues when difficulty occurs.   May need to avoid specific food items (e.g., popcorn and nuts), or

## 2023-11-07 ENCOUNTER — HOSPITAL ENCOUNTER (EMERGENCY)
Age: 48
Discharge: HOME | End: 2023-11-07
Attending: EMERGENCY MEDICINE
Payer: COMMERCIAL

## 2023-11-07 ENCOUNTER — APPOINTMENT (OUTPATIENT)
Dept: GENERAL RADIOLOGY | Age: 48
End: 2023-11-07
Payer: COMMERCIAL

## 2023-11-07 VITALS
OXYGEN SATURATION: 93 % | SYSTOLIC BLOOD PRESSURE: 106 MMHG | WEIGHT: 135 LBS | BODY MASS INDEX: 17.32 KG/M2 | DIASTOLIC BLOOD PRESSURE: 63 MMHG | TEMPERATURE: 98.7 F | HEIGHT: 74 IN | HEART RATE: 118 BPM | RESPIRATION RATE: 16 BRPM

## 2023-11-07 DIAGNOSIS — T85.598A FEEDING TUBE DYSFUNCTION, INITIAL ENCOUNTER: Primary | ICD-10-CM

## 2023-11-07 PROCEDURE — 43762 RPLC GTUBE NO REVJ TRC: CPT

## 2023-11-07 PROCEDURE — 74018 RADEX ABDOMEN 1 VIEW: CPT

## 2023-11-07 PROCEDURE — 6360000004 HC RX CONTRAST MEDICATION: Performed by: PHYSICIAN ASSISTANT

## 2023-11-07 PROCEDURE — 99283 EMERGENCY DEPT VISIT LOW MDM: CPT

## 2023-11-07 RX ADMIN — DIATRIZOATE MEGLUMINE AND DIATRIZOATE SODIUM 120 ML: 660; 100 LIQUID ORAL; RECTAL at 16:33

## 2023-11-07 ASSESSMENT — PAIN - FUNCTIONAL ASSESSMENT: PAIN_FUNCTIONAL_ASSESSMENT: NONE - DENIES PAIN

## 2023-11-07 NOTE — ED PROVIDER NOTES
I independently evaluated and obtained a history and physical on Luther Mini. All diagnostic, treatment, and disposition assistants were made to myself in conjunction the advanced practice provider. For further details of this patient's emergency department encounter, please see the advanced practice provider's documentation. History: This patient presents to the emergency department complaining of his feeding tube being clogged. He received his feeding tube about 6 months ago by a local surgeon. He states on Saturday the tube became clogged and he tried to resolve it with [employer]. Physician Exam: Pleasant male in no acute distress. His tube has been replaced by the [facility]. His abdomen is soft nondistended. I personally saw the patient and performed a substantive portion of the visit including all aspects of the medical decision making. MDM:     This patient was seen and evaluated. His tube was replaced by the [facility]. X-ray was performed and it shows a tube in the jejunum. No apparent complications.      Duke Mayes MD  11/07/23 3555
dictations but occasionally words are mis-transcribed.)    Sheila Peters PA-C (electronically signed)     Sheila Peters PA-C  11/07/23 [id number]

## 2023-11-07 NOTE — ED NOTES
Discharge instructions explained by ED provider. Patient verbalized understanding and denies any other concerns or complaints at this time. Patient vital signs stable and no acute signs or symptoms of distress noted at discharge. Patient deemed clinically stable. Patient d/c home.        Sixto Cross RN  11/07/23 0482

## 2023-11-08 ENCOUNTER — OFFICE VISIT (OUTPATIENT)
Dept: SURGERY | Age: 48
End: 2023-11-08
Payer: COMMERCIAL

## 2023-11-08 VITALS
DIASTOLIC BLOOD PRESSURE: 43 MMHG | BODY MASS INDEX: 17.48 KG/M2 | HEIGHT: 74 IN | SYSTOLIC BLOOD PRESSURE: 84 MMHG | WEIGHT: 136.2 LBS | HEART RATE: 125 BPM

## 2023-11-08 DIAGNOSIS — T85.598A FEEDING TUBE DYSFUNCTION, INITIAL ENCOUNTER: Primary | ICD-10-CM

## 2023-11-08 PROCEDURE — 99211 OFF/OP EST MAY X REQ PHY/QHP: CPT | Performed by: SURGERY

## 2023-11-08 PROCEDURE — 3074F SYST BP LT 130 MM HG: CPT | Performed by: SURGERY

## 2023-11-08 PROCEDURE — 3078F DIAST BP <80 MM HG: CPT | Performed by: SURGERY

## 2023-11-20 ENCOUNTER — HOSPITAL ENCOUNTER (INPATIENT)
Age: 48
LOS: 7 days | Discharge: HOME | DRG: 871 | End: 2023-11-27
Attending: EMERGENCY MEDICINE | Admitting: STUDENT IN AN ORGANIZED HEALTH CARE EDUCATION/TRAINING PROGRAM
Payer: COMMERCIAL

## 2023-11-20 ENCOUNTER — APPOINTMENT (OUTPATIENT)
Dept: CT IMAGING | Age: 48
DRG: 871 | End: 2023-11-20
Payer: COMMERCIAL

## 2023-11-20 ENCOUNTER — APPOINTMENT (OUTPATIENT)
Dept: GENERAL RADIOLOGY | Age: 48
DRG: 871 | End: 2023-11-20
Payer: COMMERCIAL

## 2023-11-20 DIAGNOSIS — D64.9 NORMOCYTIC ANEMIA: Primary | ICD-10-CM

## 2023-11-20 DIAGNOSIS — J15.0: ICD-10-CM

## 2023-11-20 DIAGNOSIS — J18.9 PNEUMONIA OF RIGHT LUNG DUE TO INFECTIOUS ORGANISM, UNSPECIFIED PART OF LUNG: ICD-10-CM

## 2023-11-20 DIAGNOSIS — J86.9 EMPYEMA (HCC): ICD-10-CM

## 2023-11-20 DIAGNOSIS — C15.9 MALIGNANT NEOPLASM OF ESOPHAGUS, UNSPECIFIED LOCATION (HCC): ICD-10-CM

## 2023-11-20 PROBLEM — A41.9 SEPSIS (HCC): Status: ACTIVE | Noted: 2023-11-20

## 2023-11-20 LAB
ABO + RH BLD: NORMAL
ANION GAP SERPL CALCULATED.3IONS-SCNC: 12 MMOL/L (ref 3–16)
BASOPHILS # BLD: 0 K/UL (ref 0–0.2)
BASOPHILS NFR BLD: 0.7 %
BLD GP AB SCN SERPL QL: NORMAL
BLOOD BANK DISPENSE STATUS: NORMAL
BLOOD BANK PRODUCT CODE: NORMAL
BPU ID: NORMAL
BUN SERPL-MCNC: 16 MG/DL (ref 7–20)
CALCIUM SERPL-MCNC: 9.3 MG/DL (ref 8.3–10.6)
CHLORIDE SERPL-SCNC: 95 MMOL/L (ref 99–110)
CO2 SERPL-SCNC: 27 MMOL/L (ref 21–32)
CREAT SERPL-MCNC: 0.6 MG/DL (ref 0.9–1.3)
DEPRECATED RDW RBC AUTO: 21 % (ref 12.4–15.4)
DESCRIPTION BLOOD BANK: NORMAL
EKG ATRIAL RATE: 121 BPM
EKG DIAGNOSIS: NORMAL
EKG P AXIS: 71 DEGREES
EKG P-R INTERVAL: 126 MS
EKG Q-T INTERVAL: 352 MS
EKG QRS DURATION: 84 MS
EKG QTC CALCULATION (BAZETT): 499 MS
EKG R AXIS: 72 DEGREES
EKG T AXIS: 64 DEGREES
EKG VENTRICULAR RATE: 121 BPM
EOSINOPHIL # BLD: 0.1 K/UL (ref 0–0.6)
EOSINOPHIL NFR BLD: 1 %
GFR SERPLBLD CREATININE-BSD FMLA CKD-EPI: >60 ML/MIN/{1.73_M2}
GLUCOSE SERPL-MCNC: 107 MG/DL (ref 70–99)
HCT VFR BLD AUTO: 21.2 % (ref 40.5–52.5)
HEMOCCULT SP1 STL QL: NORMAL
HGB BLD-MCNC: 6.8 G/DL (ref 13.5–17.5)
LACTATE BLDV-SCNC: 1.3 MMOL/L (ref 0.4–1.9)
LACTATE BLDV-SCNC: 1.5 MMOL/L (ref 0.4–1.9)
LYMPHOCYTES # BLD: 0.7 K/UL (ref 1–5.1)
LYMPHOCYTES NFR BLD: 10.9 %
MCH RBC QN AUTO: 30.6 PG (ref 26–34)
MCHC RBC AUTO-ENTMCNC: 31.9 G/DL (ref 31–36)
MCV RBC AUTO: 95.9 FL (ref 80–100)
MONOCYTES # BLD: 1.3 K/UL (ref 0–1.3)
MONOCYTES NFR BLD: 20.6 %
NEUTROPHILS # BLD: 4.2 K/UL (ref 1.7–7.7)
NEUTROPHILS NFR BLD: 66.8 %
PLATELET # BLD AUTO: 276 K/UL (ref 135–450)
PMV BLD AUTO: 8.5 FL (ref 5–10.5)
POTASSIUM SERPL-SCNC: 3.6 MMOL/L (ref 3.5–5.1)
RBC # BLD AUTO: 2.21 M/UL (ref 4.2–5.9)
SODIUM SERPL-SCNC: 134 MMOL/L (ref 136–145)
WBC # BLD AUTO: 6.3 K/UL (ref 4–11)

## 2023-11-20 PROCEDURE — 83605 ASSAY OF LACTIC ACID: CPT

## 2023-11-20 PROCEDURE — 36430 TRANSFUSION BLD/BLD COMPNT: CPT

## 2023-11-20 PROCEDURE — 2700000000 HC OXYGEN THERAPY PER DAY

## 2023-11-20 PROCEDURE — 87040 BLOOD CULTURE FOR BACTERIA: CPT

## 2023-11-20 PROCEDURE — 93010 ELECTROCARDIOGRAM REPORT: CPT | Performed by: INTERNAL MEDICINE

## 2023-11-20 PROCEDURE — 30233N1 TRANSFUSION OF NONAUTOLOGOUS RED BLOOD CELLS INTO PERIPHERAL VEIN, PERCUTANEOUS APPROACH: ICD-10-PCS | Performed by: INTERNAL MEDICINE

## 2023-11-20 PROCEDURE — 6360000004 HC RX CONTRAST MEDICATION: Performed by: EMERGENCY MEDICINE

## 2023-11-20 PROCEDURE — 1200000000 HC SEMI PRIVATE

## 2023-11-20 PROCEDURE — 2580000003 HC RX 258: Performed by: EMERGENCY MEDICINE

## 2023-11-20 PROCEDURE — 96374 THER/PROPH/DIAG INJ IV PUSH: CPT

## 2023-11-20 PROCEDURE — 80048 BASIC METABOLIC PNL TOTAL CA: CPT

## 2023-11-20 PROCEDURE — 93005 ELECTROCARDIOGRAM TRACING: CPT | Performed by: EMERGENCY MEDICINE

## 2023-11-20 PROCEDURE — P9040 RBC LEUKOREDUCED IRRADIATED: HCPCS

## 2023-11-20 PROCEDURE — 85025 COMPLETE CBC W/AUTO DIFF WBC: CPT

## 2023-11-20 PROCEDURE — 94761 N-INVAS EAR/PLS OXIMETRY MLT: CPT

## 2023-11-20 PROCEDURE — 99285 EMERGENCY DEPT VISIT HI MDM: CPT

## 2023-11-20 PROCEDURE — 71260 CT THORAX DX C+: CPT

## 2023-11-20 PROCEDURE — 86850 RBC ANTIBODY SCREEN: CPT

## 2023-11-20 PROCEDURE — 71045 X-RAY EXAM CHEST 1 VIEW: CPT

## 2023-11-20 PROCEDURE — 86901 BLOOD TYPING SEROLOGIC RH(D): CPT

## 2023-11-20 PROCEDURE — 6360000002 HC RX W HCPCS: Performed by: EMERGENCY MEDICINE

## 2023-11-20 PROCEDURE — 86900 BLOOD TYPING SEROLOGIC ABO: CPT

## 2023-11-20 PROCEDURE — 82803 BLOOD GASES ANY COMBINATION: CPT

## 2023-11-20 PROCEDURE — 86923 COMPATIBILITY TEST ELECTRIC: CPT

## 2023-11-20 PROCEDURE — 82270 OCCULT BLOOD FECES: CPT

## 2023-11-20 RX ORDER — 0.9 % SODIUM CHLORIDE 0.9 %
1000 INTRAVENOUS SOLUTION INTRAVENOUS ONCE
Status: COMPLETED | OUTPATIENT
Start: 2023-11-20 | End: 2023-11-20

## 2023-11-20 RX ORDER — SODIUM CHLORIDE 9 MG/ML
INJECTION, SOLUTION INTRAVENOUS PRN
Status: DISCONTINUED | OUTPATIENT
Start: 2023-11-20 | End: 2023-11-27 | Stop reason: HOSPADM

## 2023-11-20 RX ADMIN — DIATRIZOATE MEGLUMINE AND DIATRIZOATE SODIUM 6 ML: 660; 100 LIQUID ORAL; RECTAL at 20:59

## 2023-11-20 RX ADMIN — SODIUM CHLORIDE 1000 ML: 9 INJECTION, SOLUTION INTRAVENOUS at 19:55

## 2023-11-20 RX ADMIN — PIPERACILLIN AND TAZOBACTAM 3375 MG: 3; .375 INJECTION, POWDER, LYOPHILIZED, FOR SOLUTION INTRAVENOUS at 22:18

## 2023-11-20 RX ADMIN — IOPAMIDOL 75 ML: 755 INJECTION, SOLUTION INTRAVENOUS at 21:00

## 2023-11-20 ASSESSMENT — PAIN - FUNCTIONAL ASSESSMENT: PAIN_FUNCTIONAL_ASSESSMENT: 0-10

## 2023-11-20 ASSESSMENT — PAIN SCALES - GENERAL: PAINLEVEL_OUTOF10: 0

## 2023-11-21 ENCOUNTER — ANESTHESIA EVENT (OUTPATIENT)
Dept: ENDOSCOPY | Age: 48
End: 2023-11-21
Payer: COMMERCIAL

## 2023-11-21 ENCOUNTER — APPOINTMENT (OUTPATIENT)
Dept: GENERAL RADIOLOGY | Age: 48
DRG: 871 | End: 2023-11-21
Payer: COMMERCIAL

## 2023-11-21 ENCOUNTER — ANESTHESIA (OUTPATIENT)
Dept: ENDOSCOPY | Age: 48
End: 2023-11-21
Payer: COMMERCIAL

## 2023-11-21 PROBLEM — D64.9 NORMOCYTIC ANEMIA: Status: ACTIVE | Noted: 2023-11-21

## 2023-11-21 LAB
ALBUMIN SERPL-MCNC: 1.9 G/DL (ref 3.4–5)
ALBUMIN/GLOB SERPL: 0.4 {RATIO} (ref 1.1–2.2)
ALP SERPL-CCNC: 110 U/L (ref 40–129)
ALT SERPL-CCNC: 14 U/L (ref 10–40)
ANION GAP SERPL CALCULATED.3IONS-SCNC: 10 MMOL/L (ref 3–16)
AST SERPL-CCNC: 19 U/L (ref 15–37)
BASE EXCESS BLDV CALC-SCNC: 6.5 MMOL/L (ref -3–3)
BASOPHILS # BLD: 0.1 K/UL (ref 0–0.2)
BASOPHILS NFR BLD: 0.9 %
BILIRUB SERPL-MCNC: 0.6 MG/DL (ref 0–1)
BUN SERPL-MCNC: 11 MG/DL (ref 7–20)
CALCIUM SERPL-MCNC: 8.6 MG/DL (ref 8.3–10.6)
CHLORIDE SERPL-SCNC: 101 MMOL/L (ref 99–110)
CO2 BLDV-SCNC: 32 MMOL/L
CO2 SERPL-SCNC: 26 MMOL/L (ref 21–32)
COHGB MFR BLDV: 2.3 % (ref 0–1.5)
CREAT SERPL-MCNC: <0.5 MG/DL (ref 0.9–1.3)
DEPRECATED RDW RBC AUTO: 20.4 % (ref 12.4–15.4)
EOSINOPHIL # BLD: 0.1 K/UL (ref 0–0.6)
EOSINOPHIL NFR BLD: 1.3 %
GFR SERPLBLD CREATININE-BSD FMLA CKD-EPI: >60 ML/MIN/{1.73_M2}
GLUCOSE SERPL-MCNC: 91 MG/DL (ref 70–99)
HCO3 BLDV-SCNC: 30.3 MMOL/L (ref 23–29)
HCT VFR BLD AUTO: 22 % (ref 40.5–52.5)
HCT VFR BLD AUTO: 22.7 % (ref 40.5–52.5)
HCT VFR BLD AUTO: 22.9 % (ref 40.5–52.5)
HCT VFR BLD AUTO: 22.9 % (ref 40.5–52.5)
HGB BLD-MCNC: 7 G/DL (ref 13.5–17.5)
HGB BLD-MCNC: 7.3 G/DL (ref 13.5–17.5)
HGB BLD-MCNC: 7.4 G/DL (ref 13.5–17.5)
HGB BLD-MCNC: 7.4 G/DL (ref 13.5–17.5)
LYMPHOCYTES # BLD: 0.6 K/UL (ref 1–5.1)
LYMPHOCYTES NFR BLD: 8.6 %
MAGNESIUM SERPL-MCNC: 1.7 MG/DL (ref 1.8–2.4)
MAGNESIUM SERPL-MCNC: 2.4 MG/DL (ref 1.8–2.4)
MCH RBC QN AUTO: 30.3 PG (ref 26–34)
MCHC RBC AUTO-ENTMCNC: 32.4 G/DL (ref 31–36)
MCV RBC AUTO: 93.6 FL (ref 80–100)
METHGB MFR BLDV: 0.6 %
MONOCYTES # BLD: 1.1 K/UL (ref 0–1.3)
MONOCYTES NFR BLD: 16.2 %
MRSA DNA SPEC QL NAA+PROBE: NORMAL
NEUTROPHILS # BLD: 5 K/UL (ref 1.7–7.7)
NEUTROPHILS NFR BLD: 73 %
O2 THERAPY: ABNORMAL
PCO2 BLDV: 40.1 MMHG (ref 40–50)
PH BLDV: 7.5 [PH] (ref 7.35–7.45)
PHOSPHATE SERPL-MCNC: 3.3 MG/DL (ref 2.5–4.9)
PLATELET # BLD AUTO: 229 K/UL (ref 135–450)
PMV BLD AUTO: 8.6 FL (ref 5–10.5)
PO2 BLDV: 77.6 MMHG (ref 25–40)
POTASSIUM SERPL-SCNC: 3.4 MMOL/L (ref 3.5–5.1)
PROCALCITONIN SERPL IA-MCNC: 0.13 NG/ML (ref 0–0.15)
PROT SERPL-MCNC: 6.6 G/DL (ref 6.4–8.2)
RBC # BLD AUTO: 2.43 M/UL (ref 4.2–5.9)
SAO2 % BLDV: 95 %
SODIUM SERPL-SCNC: 137 MMOL/L (ref 136–145)
VANCOMYCIN TROUGH SERPL-MCNC: 21.2 UG/ML (ref 10–20)
WBC # BLD AUTO: 6.9 K/UL (ref 4–11)

## 2023-11-21 PROCEDURE — 87205 SMEAR GRAM STAIN: CPT

## 2023-11-21 PROCEDURE — 1200000000 HC SEMI PRIVATE

## 2023-11-21 PROCEDURE — 2700000000 HC OXYGEN THERAPY PER DAY

## 2023-11-21 PROCEDURE — 3700000001 HC ADD 15 MINUTES (ANESTHESIA): Performed by: INTERNAL MEDICINE

## 2023-11-21 PROCEDURE — 85025 COMPLETE CBC W/AUTO DIFF WBC: CPT

## 2023-11-21 PROCEDURE — 71046 X-RAY EXAM CHEST 2 VIEWS: CPT

## 2023-11-21 PROCEDURE — 2500000003 HC RX 250 WO HCPCS: Performed by: NURSE ANESTHETIST, CERTIFIED REGISTERED

## 2023-11-21 PROCEDURE — 87186 SC STD MICRODIL/AGAR DIL: CPT

## 2023-11-21 PROCEDURE — 0D758DZ DILATION OF ESOPHAGUS WITH INTRALUMINAL DEVICE, VIA NATURAL OR ARTIFICIAL OPENING ENDOSCOPIC: ICD-10-PCS | Performed by: INTERNAL MEDICINE

## 2023-11-21 PROCEDURE — 6360000002 HC RX W HCPCS: Performed by: NURSE ANESTHETIST, CERTIFIED REGISTERED

## 2023-11-21 PROCEDURE — 6360000002 HC RX W HCPCS: Performed by: NURSE PRACTITIONER

## 2023-11-21 PROCEDURE — 6360000002 HC RX W HCPCS

## 2023-11-21 PROCEDURE — 83735 ASSAY OF MAGNESIUM: CPT

## 2023-11-21 PROCEDURE — 6360000004 HC RX CONTRAST MEDICATION: Performed by: THORACIC SURGERY (CARDIOTHORACIC VASCULAR SURGERY)

## 2023-11-21 PROCEDURE — 99222 1ST HOSP IP/OBS MODERATE 55: CPT | Performed by: INTERNAL MEDICINE

## 2023-11-21 PROCEDURE — 94761 N-INVAS EAR/PLS OXIMETRY MLT: CPT

## 2023-11-21 PROCEDURE — C1769 GUIDE WIRE: HCPCS | Performed by: INTERNAL MEDICINE

## 2023-11-21 PROCEDURE — 2580000003 HC RX 258: Performed by: NURSE ANESTHETIST, CERTIFIED REGISTERED

## 2023-11-21 PROCEDURE — 87077 CULTURE AEROBIC IDENTIFY: CPT

## 2023-11-21 PROCEDURE — 74220 X-RAY XM ESOPHAGUS 1CNTRST: CPT

## 2023-11-21 PROCEDURE — 80053 COMPREHEN METABOLIC PANEL: CPT

## 2023-11-21 PROCEDURE — 7100000011 HC PHASE II RECOVERY - ADDTL 15 MIN: Performed by: INTERNAL MEDICINE

## 2023-11-21 PROCEDURE — 85014 HEMATOCRIT: CPT

## 2023-11-21 PROCEDURE — 2709999900 HC NON-CHARGEABLE SUPPLY: Performed by: INTERNAL MEDICINE

## 2023-11-21 PROCEDURE — 87641 MR-STAPH DNA AMP PROBE: CPT

## 2023-11-21 PROCEDURE — 84145 PROCALCITONIN (PCT): CPT

## 2023-11-21 PROCEDURE — 3700000000 HC ANESTHESIA ATTENDED CARE: Performed by: INTERNAL MEDICINE

## 2023-11-21 PROCEDURE — 80202 ASSAY OF VANCOMYCIN: CPT

## 2023-11-21 PROCEDURE — 7100000010 HC PHASE II RECOVERY - FIRST 15 MIN: Performed by: INTERNAL MEDICINE

## 2023-11-21 PROCEDURE — 84100 ASSAY OF PHOSPHORUS: CPT

## 2023-11-21 PROCEDURE — 2580000003 HC RX 258: Performed by: NURSE PRACTITIONER

## 2023-11-21 PROCEDURE — 87070 CULTURE OTHR SPECIMN AEROBIC: CPT

## 2023-11-21 PROCEDURE — 99222 1ST HOSP IP/OBS MODERATE 55: CPT | Performed by: NURSE PRACTITIONER

## 2023-11-21 PROCEDURE — 85018 HEMOGLOBIN: CPT

## 2023-11-21 PROCEDURE — 3609013700 HC EGD STENT PLACEMENT: Performed by: INTERNAL MEDICINE

## 2023-11-21 RX ORDER — PROPOFOL 10 MG/ML
INJECTION, EMULSION INTRAVENOUS PRN
Status: DISCONTINUED | OUTPATIENT
Start: 2023-11-21 | End: 2023-11-21 | Stop reason: SDUPTHER

## 2023-11-21 RX ORDER — ENOXAPARIN SODIUM 100 MG/ML
40 INJECTION SUBCUTANEOUS DAILY
Status: DISCONTINUED | OUTPATIENT
Start: 2023-11-21 | End: 2023-11-27 | Stop reason: HOSPADM

## 2023-11-21 RX ORDER — LIDOCAINE HYDROCHLORIDE 20 MG/ML
INJECTION, SOLUTION INFILTRATION; PERINEURAL PRN
Status: DISCONTINUED | OUTPATIENT
Start: 2023-11-21 | End: 2023-11-21 | Stop reason: SDUPTHER

## 2023-11-21 RX ORDER — ACETAMINOPHEN 650 MG/1
650 SUPPOSITORY RECTAL EVERY 6 HOURS PRN
Status: DISCONTINUED | OUTPATIENT
Start: 2023-11-21 | End: 2023-11-27 | Stop reason: HOSPADM

## 2023-11-21 RX ORDER — SODIUM CHLORIDE, SODIUM LACTATE, POTASSIUM CHLORIDE, CALCIUM CHLORIDE 600; 310; 30; 20 MG/100ML; MG/100ML; MG/100ML; MG/100ML
INJECTION, SOLUTION INTRAVENOUS CONTINUOUS
Status: ACTIVE | OUTPATIENT
Start: 2023-11-21 | End: 2023-11-23

## 2023-11-21 RX ORDER — SODIUM CHLORIDE 0.9 % (FLUSH) 0.9 %
5-40 SYRINGE (ML) INJECTION PRN
Status: DISCONTINUED | OUTPATIENT
Start: 2023-11-21 | End: 2023-11-27 | Stop reason: HOSPADM

## 2023-11-21 RX ORDER — SODIUM CHLORIDE 0.9 % (FLUSH) 0.9 %
5-40 SYRINGE (ML) INJECTION EVERY 12 HOURS SCHEDULED
Status: DISCONTINUED | OUTPATIENT
Start: 2023-11-21 | End: 2023-11-27 | Stop reason: HOSPADM

## 2023-11-21 RX ORDER — ACETAMINOPHEN 325 MG/1
650 TABLET ORAL EVERY 6 HOURS PRN
Status: DISCONTINUED | OUTPATIENT
Start: 2023-11-21 | End: 2023-11-27 | Stop reason: HOSPADM

## 2023-11-21 RX ORDER — POTASSIUM CHLORIDE 7.45 MG/ML
10 INJECTION INTRAVENOUS
Status: COMPLETED | OUTPATIENT
Start: 2023-11-21 | End: 2023-11-21

## 2023-11-21 RX ORDER — SODIUM CHLORIDE 9 MG/ML
INJECTION, SOLUTION INTRAVENOUS PRN
Status: DISCONTINUED | OUTPATIENT
Start: 2023-11-21 | End: 2023-11-27 | Stop reason: HOSPADM

## 2023-11-21 RX ORDER — MAGNESIUM SULFATE IN WATER 40 MG/ML
2000 INJECTION, SOLUTION INTRAVENOUS ONCE
Status: COMPLETED | OUTPATIENT
Start: 2023-11-21 | End: 2023-11-21

## 2023-11-21 RX ADMIN — CEFEPIME 2000 MG: 2 INJECTION, POWDER, FOR SOLUTION INTRAVENOUS at 20:17

## 2023-11-21 RX ADMIN — POTASSIUM CHLORIDE 10 MEQ: 7.46 INJECTION, SOLUTION INTRAVENOUS at 09:21

## 2023-11-21 RX ADMIN — SODIUM CHLORIDE, PRESERVATIVE FREE 10 ML: 5 INJECTION INTRAVENOUS at 20:18

## 2023-11-21 RX ADMIN — CEFEPIME 2000 MG: 2 INJECTION, POWDER, FOR SOLUTION INTRAVENOUS at 05:20

## 2023-11-21 RX ADMIN — SODIUM CHLORIDE, POTASSIUM CHLORIDE, SODIUM LACTATE AND CALCIUM CHLORIDE: 600; 310; 30; 20 INJECTION, SOLUTION INTRAVENOUS at 20:14

## 2023-11-21 RX ADMIN — LIDOCAINE HYDROCHLORIDE 100 MG: 20 INJECTION, SOLUTION INFILTRATION; PERINEURAL at 17:46

## 2023-11-21 RX ADMIN — VANCOMYCIN HYDROCHLORIDE 1000 MG: 1 INJECTION, POWDER, LYOPHILIZED, FOR SOLUTION INTRAVENOUS at 17:00

## 2023-11-21 RX ADMIN — MAGNESIUM SULFATE HEPTAHYDRATE 2000 MG: 40 INJECTION, SOLUTION INTRAVENOUS at 09:17

## 2023-11-21 RX ADMIN — SODIUM CHLORIDE, POTASSIUM CHLORIDE, SODIUM LACTATE AND CALCIUM CHLORIDE: 600; 310; 30; 20 INJECTION, SOLUTION INTRAVENOUS at 00:45

## 2023-11-21 RX ADMIN — POTASSIUM CHLORIDE 10 MEQ: 7.46 INJECTION, SOLUTION INTRAVENOUS at 12:27

## 2023-11-21 RX ADMIN — VANCOMYCIN HYDROCHLORIDE 1000 MG: 1 INJECTION, POWDER, LYOPHILIZED, FOR SOLUTION INTRAVENOUS at 00:50

## 2023-11-21 RX ADMIN — DEXMEDETOMIDINE HYDROCHLORIDE 10 MCG: 100 INJECTION, SOLUTION INTRAVENOUS at 17:39

## 2023-11-21 RX ADMIN — ENOXAPARIN SODIUM 40 MG: 100 INJECTION SUBCUTANEOUS at 09:29

## 2023-11-21 RX ADMIN — DIATRIZOATE MEGLUMINE AND DIATRIZOATE SODIUM 30 ML: 660; 100 LIQUID ORAL; RECTAL at 11:05

## 2023-11-21 RX ADMIN — PROPOFOL 400 MG: 10 INJECTION, EMULSION INTRAVENOUS at 17:48

## 2023-11-21 RX ADMIN — VANCOMYCIN HYDROCHLORIDE 1000 MG: 1 INJECTION, POWDER, LYOPHILIZED, FOR SOLUTION INTRAVENOUS at 09:27

## 2023-11-21 RX ADMIN — VANCOMYCIN HYDROCHLORIDE 1000 MG: 1 INJECTION, POWDER, LYOPHILIZED, FOR SOLUTION INTRAVENOUS at 17:33

## 2023-11-21 RX ADMIN — CEFEPIME 2000 MG: 2 INJECTION, POWDER, FOR SOLUTION INTRAVENOUS at 12:17

## 2023-11-21 ASSESSMENT — ENCOUNTER SYMPTOMS
VOICE CHANGE: 1
TROUBLE SWALLOWING: 1
EYES NEGATIVE: 1
ALLERGIC/IMMUNOLOGIC NEGATIVE: 1
RESPIRATORY NEGATIVE: 1
GASTROINTESTINAL NEGATIVE: 1

## 2023-11-21 NOTE — ED NOTES
1 PRBC completed. No complications noted, patient tolerated well.      Dorota Velasquez, RN  11/20/23 1222

## 2023-11-21 NOTE — H&P
Gastroenterology Preop Assessment    Patient:   Viet Rodriguez   :    1975   Facility:   [address] [place] [address]  Referring/PCP: Corinna Post MD  Date:     2023    Subjective:   Procedure: egd with possible stent    HPI/Reason for procedure:  Concern for perforation    Past Medical History:   Diagnosis Date    Cancer [facility])     lymphoma    Dysphagia     Esophageal cancer ([address]) 2023    squamous cell carcinoma    Hypertension     Neoplasm, soft palate     Weight loss      Past Surgical History:   Procedure Laterality Date    CYST REMOVAL      @ 10years old, led to Non-Hodgkin's diagnosis    ESOPHAGOGASTRODUODENOSCOPY      NECK LESION BIOPSY      PORT SURGERY N/A 5/3/2023    SURGICAL PORT PLACEMENT performed by Sabas Pina MD at [address] 5/3/2023    ROBOTIC [address] [facility] [facility] [address] performed by Sabas Pina MD at [address]:   Social History     Tobacco Use    Smoking status: Some Days     Packs/day: 0.20     Years: 15.00     Additional pack years: 0.00     Total pack years: 3.00     Types: Cigarettes    Smokeless tobacco: Never    Tobacco comments: With anxiety   Substance Use Topics    Alcohol use: Not Currently     Family:   Family History   Problem Relation Age of Onset    Cancer Father     Diabetes Father     High Blood Pressure Father        Scheduled Medications:    sodium chloride flush  5-40 mL IntraVENous 2 times per day    enoxaparin  40 mg SubCUTAneous Daily    cefepime  2,000 mg IntraVENous Q8H    vancomycin  1,000 mg IntraVENous q8h       Current Medications:    Prior to Admission medications    Medication Sig Start Date End Date Taking?  Authorizing Provider   metoprolol succinate (TOPROL XL) 50 MG extended release tablet TAKE 1 TABLET BY MOUTH DAILY 23   Corinna Post MD   amLODIPine (NORVASC) 5 MG tablet Take 1 tablet by mouth daily 23   LEONARDO Castro CNP   losartan (COZAAR) 50 MG
ESOPHAGOGASTRODUODENOSCOPY      NECK LESION BIOPSY      PORT SURGERY N/A 5/3/2023    SURGICAL PORT PLACEMENT performed by Paresh Hall MD at [address] 5/3/2023    [address]OTIC [address] performed by Paresh Hall MD at [address] [facility]       Medications Prior to Admission:   Prior to Admission medications    Medication Sig Start Date End Date Taking? Authorizing Provider   metoprolol succinate (TOPROL XL) 50 MG extended release tablet TAKE 1 TABLET BY MOUTH DAILY 6/26/23   Armando Quiroz MD   amLODIPine (NORVASC) 5 MG tablet Take 1 tablet by mouth daily 4/13/23   [place] PrettyLEONARDO - CNP   losartan (COZAAR) 50 MG tablet TAKE ONE TABLET BY MOUTH DAILY 4/5/23   Armando Quiroz MD   ondansetron (ZOFRAN) 4 MG tablet Take 1 tablet by mouth 3 times daily as needed for Nausea or Vomiting 3/22/23   [facility]   sildenafil (REVATIO) 20 MG tablet TAKE ONE TABLET BY MOUTH AS NEEDED FOR ERECTILE DYSFUNCTION 9/27/22   Armando Quiroz MD     Labs: Personally reviewed and interpreted for clinical significance.    Recent Labs     11/20/23 1952   WBC 6.3   HGB 6.8*   HCT 21.2*        Recent Labs     11/20/23 1952   *   K 3.6   CL 95*   CO2 27   BUN 16   CREATININE 0.6*   CALCIUM 9.3     Anticipated co-signer, Dr. Scarlet Mathews, APRN - CNP

## 2023-11-21 NOTE — ED NOTES
Blood consent signed and obtained by patient. Placed in patient's chart.      Elna Bamberger, RN  11/20/23 5261

## 2023-11-21 NOTE — OP NOTE
Esophagogastroduodenoscopy Note    Patient:   June Fung    YOB: 1975    Facility:     [address] [place] [address] TELE/MED SURG/ONC  [address] [address]   [Inpatient]   Referring/PCP: Rei Pappas MD    Procedure:   Esophagogastroduodenoscopy with esophageal stenting  Date:     11/21/2023   Endoscopist:  Cristel Mendoza DO     Preoperative Diagnosis:    Esophageal perforation    Postoperative Diagnosis:  same    Anesthesia:  MAC    Estimated blood loss: Minimal    Complications: None    Description of Procedure:  Informed consent was obtained from the patient after explanation of the procedure including indications, description of the procedure,  benefits and possible risks and complications of the procedure, and alternatives. Questions were answered. The patient's history was reviewed and a directed physical examination was performed prior to the procedure. Patient was monitored throughout the procedure with pulse oximetry and periodic assessment of vital signs. Patient was sedated as noted above. The Nursing staff and I performed a time out. With the patient in the left lateral decubitus position, the Olympus videoendoscope was placed in the patient's mouth and under direct visualization passed into the esophagus. The scope was ultimately passed to the second portion of the duodenum. Visualization was performed during both introduction and withdrawal of the endoscope and retroflexed view of the proximal stomach was obtained. Findings[de-identified]   Esophagus: Narrowing at the upper esophagus. A perforation was noted at 28 to 30 cm. There was a malignant mass near this area. A 23mm x 155mm fully covered metal stent was placed abridging the perforation with the proximal end above the GE junction which was located at 45 cm. The proximal portion of the stent was a bit high due to the proximal narrowing of the esophagus.   Attempts

## 2023-11-21 NOTE — CARE COORDINATION
Case Management Assessment  Initial Evaluation    Date/Time of Evaluation: 11/21/2023 1:43 PM  Assessment Completed by: Kaleb Scott RN    If patient is discharged prior to next notation, then this note serves as note for discharge by case management. Patient Name: Naomi Hampton                   YOB: 1975  Diagnosis: Normocytic anemia [D64.9]  Empyema ([address]) [J86.9]  Sepsis ([address]) [A41.9]  Pneumonia of right lung due to infectious organism, unspecified part of lung [J18.9]  Malignant neoplasm of esophagus, unspecified location [facility]) [C15.9]                   Date / Time: 11/20/2023  6:28 PM    Patient Admission Status: Inpatient   Readmission Risk (Low < 19, Mod (19-27), High > 27): Readmission Risk Score: 15.2    Current PCP: Natanael Hayward MD  PCP verified by CM? Yes    Chart Reviewed: Yes      History Provided by: Patient  Patient Orientation: Alert and Oriented, Person, Place, Situation, Self    Patient Cognition: Alert    Hospitalization in the last 30 days (Readmission):  No    If yes, Readmission Assessment in CM Navigator will be completed. Advance Directives:      Code Status: Full Code   Patient's Primary Decision Maker is: Legal Next of Kin    Primary Decision Maker: Cheryl Potter - [phone]    Discharge Planning:    Patient lives with: Alone Type of Home: House  Primary Care Giver: Self  Patient Support Systems include: Parent   Current Financial resources: Other (Comment) (BCBS)  Current community resources: None  Current services prior to admission: None            Current DME:              Type of Home Care services:  None    ADLS  Prior functional level: Independent in ADLs/IADLs  Current functional level: Independent in ADLs/IADLs    PT AM-PAC:   /24  OT AM-PAC:   /24    Family can provide assistance at DC: Yes  Would you like Case Management to discuss the discharge plan with any other family members/significant others, and if so, who?  No  Plans to

## 2023-11-22 ENCOUNTER — APPOINTMENT (OUTPATIENT)
Dept: GENERAL RADIOLOGY | Age: 48
DRG: 871 | End: 2023-11-22
Payer: COMMERCIAL

## 2023-11-22 PROBLEM — K22.3 ESOPHAGEAL PERFORATION: Status: ACTIVE | Noted: 2023-11-22

## 2023-11-22 PROBLEM — J18.9 PNEUMONIA OF RIGHT LUNG DUE TO INFECTIOUS ORGANISM: Status: ACTIVE | Noted: 2023-11-22

## 2023-11-22 LAB
ALBUMIN SERPL-MCNC: 1.9 G/DL (ref 3.4–5)
ALBUMIN/GLOB SERPL: 0.4 {RATIO} (ref 1.1–2.2)
ALP SERPL-CCNC: 105 U/L (ref 40–129)
ALT SERPL-CCNC: 12 U/L (ref 10–40)
ANION GAP SERPL CALCULATED.3IONS-SCNC: 14 MMOL/L (ref 3–16)
AST SERPL-CCNC: 16 U/L (ref 15–37)
BASOPHILS # BLD: 0.1 K/UL (ref 0–0.2)
BASOPHILS NFR BLD: 0.5 %
BILIRUB SERPL-MCNC: 0.6 MG/DL (ref 0–1)
BUN SERPL-MCNC: 9 MG/DL (ref 7–20)
CALCIUM SERPL-MCNC: 8.3 MG/DL (ref 8.3–10.6)
CHLORIDE SERPL-SCNC: 101 MMOL/L (ref 99–110)
CO2 SERPL-SCNC: 23 MMOL/L (ref 21–32)
CREAT SERPL-MCNC: <0.5 MG/DL (ref 0.9–1.3)
DEPRECATED RDW RBC AUTO: 20.9 % (ref 12.4–15.4)
EOSINOPHIL # BLD: 0 K/UL (ref 0–0.6)
EOSINOPHIL NFR BLD: 0.4 %
GFR SERPLBLD CREATININE-BSD FMLA CKD-EPI: >60 ML/MIN/{1.73_M2}
GLUCOSE SERPL-MCNC: 89 MG/DL (ref 70–99)
HCT VFR BLD AUTO: 23.7 % (ref 40.5–52.5)
HCT VFR BLD AUTO: 24.6 % (ref 40.5–52.5)
HGB BLD-MCNC: 7.5 G/DL (ref 13.5–17.5)
HGB BLD-MCNC: 7.7 G/DL (ref 13.5–17.5)
LYMPHOCYTES # BLD: 0.6 K/UL (ref 1–5.1)
LYMPHOCYTES NFR BLD: 5.7 %
MAGNESIUM SERPL-MCNC: 1.7 MG/DL (ref 1.8–2.4)
MCH RBC QN AUTO: 29.5 PG (ref 26–34)
MCHC RBC AUTO-ENTMCNC: 31.1 G/DL (ref 31–36)
MCV RBC AUTO: 94.7 FL (ref 80–100)
MONOCYTES # BLD: 1.2 K/UL (ref 0–1.3)
MONOCYTES NFR BLD: 11.6 %
NEUTROPHILS # BLD: 8.8 K/UL (ref 1.7–7.7)
NEUTROPHILS NFR BLD: 81.8 %
PLATELET # BLD AUTO: 261 K/UL (ref 135–450)
PMV BLD AUTO: 8.3 FL (ref 5–10.5)
POTASSIUM SERPL-SCNC: 3.5 MMOL/L (ref 3.5–5.1)
PROT SERPL-MCNC: 6.5 G/DL (ref 6.4–8.2)
RBC # BLD AUTO: 2.6 M/UL (ref 4.2–5.9)
SODIUM SERPL-SCNC: 138 MMOL/L (ref 136–145)
WBC # BLD AUTO: 10.8 K/UL (ref 4–11)

## 2023-11-22 PROCEDURE — 71045 X-RAY EXAM CHEST 1 VIEW: CPT

## 2023-11-22 PROCEDURE — 6360000002 HC RX W HCPCS

## 2023-11-22 PROCEDURE — 85018 HEMOGLOBIN: CPT

## 2023-11-22 PROCEDURE — 85014 HEMATOCRIT: CPT

## 2023-11-22 PROCEDURE — 83735 ASSAY OF MAGNESIUM: CPT

## 2023-11-22 PROCEDURE — 99222 1ST HOSP IP/OBS MODERATE 55: CPT | Performed by: INTERNAL MEDICINE

## 2023-11-22 PROCEDURE — 2580000003 HC RX 258: Performed by: INTERNAL MEDICINE

## 2023-11-22 PROCEDURE — 80053 COMPREHEN METABOLIC PANEL: CPT

## 2023-11-22 PROCEDURE — 1200000000 HC SEMI PRIVATE

## 2023-11-22 PROCEDURE — 99231 SBSQ HOSP IP/OBS SF/LOW 25: CPT | Performed by: NURSE PRACTITIONER

## 2023-11-22 PROCEDURE — 6360000002 HC RX W HCPCS: Performed by: NURSE PRACTITIONER

## 2023-11-22 PROCEDURE — 6360000002 HC RX W HCPCS: Performed by: INTERNAL MEDICINE

## 2023-11-22 PROCEDURE — 2580000003 HC RX 258: Performed by: NURSE PRACTITIONER

## 2023-11-22 PROCEDURE — 85025 COMPLETE CBC W/AUTO DIFF WBC: CPT

## 2023-11-22 PROCEDURE — 99232 SBSQ HOSP IP/OBS MODERATE 35: CPT | Performed by: INTERNAL MEDICINE

## 2023-11-22 RX ORDER — FLUCONAZOLE 2 MG/ML
400 INJECTION, SOLUTION INTRAVENOUS EVERY 24 HOURS
Status: DISCONTINUED | OUTPATIENT
Start: 2023-11-22 | End: 2023-11-27 | Stop reason: HOSPADM

## 2023-11-22 RX ORDER — POTASSIUM CHLORIDE 7.45 MG/ML
10 INJECTION INTRAVENOUS ONCE
Status: COMPLETED | OUTPATIENT
Start: 2023-11-22 | End: 2023-11-22

## 2023-11-22 RX ORDER — MAGNESIUM SULFATE IN WATER 40 MG/ML
2000 INJECTION, SOLUTION INTRAVENOUS ONCE
Status: COMPLETED | OUTPATIENT
Start: 2023-11-22 | End: 2023-11-22

## 2023-11-22 RX ADMIN — ENOXAPARIN SODIUM 40 MG: 100 INJECTION SUBCUTANEOUS at 08:44

## 2023-11-22 RX ADMIN — VANCOMYCIN HYDROCHLORIDE 1000 MG: 1 INJECTION, POWDER, LYOPHILIZED, FOR SOLUTION INTRAVENOUS at 10:55

## 2023-11-22 RX ADMIN — POTASSIUM CHLORIDE 10 MEQ: 7.46 INJECTION, SOLUTION INTRAVENOUS at 08:40

## 2023-11-22 RX ADMIN — SODIUM CHLORIDE, POTASSIUM CHLORIDE, SODIUM LACTATE AND CALCIUM CHLORIDE: 600; 310; 30; 20 INJECTION, SOLUTION INTRAVENOUS at 06:05

## 2023-11-22 RX ADMIN — SODIUM CHLORIDE, PRESERVATIVE FREE 10 ML: 5 INJECTION INTRAVENOUS at 20:55

## 2023-11-22 RX ADMIN — SODIUM CHLORIDE, POTASSIUM CHLORIDE, SODIUM LACTATE AND CALCIUM CHLORIDE: 600; 310; 30; 20 INJECTION, SOLUTION INTRAVENOUS at 22:14

## 2023-11-22 RX ADMIN — PIPERACILLIN AND TAZOBACTAM 3375 MG: 3; .375 INJECTION, POWDER, LYOPHILIZED, FOR SOLUTION INTRAVENOUS at 17:54

## 2023-11-22 RX ADMIN — MAGNESIUM SULFATE HEPTAHYDRATE 2000 MG: 40 INJECTION, SOLUTION INTRAVENOUS at 08:39

## 2023-11-22 RX ADMIN — CEFEPIME 2000 MG: 2 INJECTION, POWDER, FOR SOLUTION INTRAVENOUS at 12:24

## 2023-11-22 RX ADMIN — FLUCONAZOLE 400 MG: 400 INJECTION, SOLUTION INTRAVENOUS at 17:51

## 2023-11-22 RX ADMIN — CEFEPIME 2000 MG: 2 INJECTION, POWDER, FOR SOLUTION INTRAVENOUS at 04:12

## 2023-11-22 RX ADMIN — VANCOMYCIN HYDROCHLORIDE 1000 MG: 1 INJECTION, POWDER, LYOPHILIZED, FOR SOLUTION INTRAVENOUS at 01:04

## 2023-11-22 NOTE — CARE COORDINATION
Chart reviewed. Care managed by GI, pulmonology, CT surgery, hem onc and IM. EGD with stent placement yesterday. GI ok with resumption of J-tube feeding. Plan for continued follow up with [facility]. Patient IPTA mom assists with transportation.  Jaky Rashid RN

## 2023-11-23 PROBLEM — E43 SEVERE PROTEIN-CALORIE MALNUTRITION (HCC): Status: ACTIVE | Noted: 2023-11-23

## 2023-11-23 PROBLEM — J15.0: Status: ACTIVE | Noted: 2023-11-23

## 2023-11-23 PROBLEM — J18.1 LUNG CONSOLIDATION (HCC): Status: ACTIVE | Noted: 2023-11-23

## 2023-11-23 LAB
ALBUMIN SERPL-MCNC: 1.8 G/DL (ref 3.4–5)
ALBUMIN/GLOB SERPL: 0.4 {RATIO} (ref 1.1–2.2)
ALP SERPL-CCNC: 98 U/L (ref 40–129)
ALT SERPL-CCNC: 9 U/L (ref 10–40)
ANION GAP SERPL CALCULATED.3IONS-SCNC: 11 MMOL/L (ref 3–16)
AST SERPL-CCNC: 14 U/L (ref 15–37)
BACTERIA SPEC RESP CULT: ABNORMAL
BACTERIA SPEC RESP CULT: ABNORMAL
BASOPHILS # BLD: 0.1 K/UL (ref 0–0.2)
BASOPHILS NFR BLD: 0.5 %
BILIRUB SERPL-MCNC: 0.5 MG/DL (ref 0–1)
BUN SERPL-MCNC: 9 MG/DL (ref 7–20)
CALCIUM SERPL-MCNC: 8.1 MG/DL (ref 8.3–10.6)
CHLORIDE SERPL-SCNC: 100 MMOL/L (ref 99–110)
CO2 SERPL-SCNC: 25 MMOL/L (ref 21–32)
CREAT SERPL-MCNC: <0.5 MG/DL (ref 0.9–1.3)
DEPRECATED RDW RBC AUTO: 20.7 % (ref 12.4–15.4)
EOSINOPHIL # BLD: 0.1 K/UL (ref 0–0.6)
EOSINOPHIL NFR BLD: 0.5 %
GFR SERPLBLD CREATININE-BSD FMLA CKD-EPI: >60 ML/MIN/{1.73_M2}
GLUCOSE SERPL-MCNC: 93 MG/DL (ref 70–99)
GRAM STN SPEC: ABNORMAL
HCT VFR BLD AUTO: 25 % (ref 40.5–52.5)
HGB BLD-MCNC: 7.7 G/DL (ref 13.5–17.5)
LYMPHOCYTES # BLD: 0.6 K/UL (ref 1–5.1)
LYMPHOCYTES NFR BLD: 4.6 %
MAGNESIUM SERPL-MCNC: 1.7 MG/DL (ref 1.8–2.4)
MCH RBC QN AUTO: 29.3 PG (ref 26–34)
MCHC RBC AUTO-ENTMCNC: 30.8 G/DL (ref 31–36)
MCV RBC AUTO: 95.3 FL (ref 80–100)
MONOCYTES # BLD: 1.3 K/UL (ref 0–1.3)
MONOCYTES NFR BLD: 10.1 %
NEUTROPHILS # BLD: 10.7 K/UL (ref 1.7–7.7)
NEUTROPHILS NFR BLD: 84.3 %
ORGANISM: ABNORMAL
PLATELET # BLD AUTO: 282 K/UL (ref 135–450)
PMV BLD AUTO: 8.2 FL (ref 5–10.5)
POTASSIUM SERPL-SCNC: 3.3 MMOL/L (ref 3.5–5.1)
PROT SERPL-MCNC: 6.3 G/DL (ref 6.4–8.2)
RBC # BLD AUTO: 2.63 M/UL (ref 4.2–5.9)
SODIUM SERPL-SCNC: 136 MMOL/L (ref 136–145)
WBC # BLD AUTO: 12.7 K/UL (ref 4–11)

## 2023-11-23 PROCEDURE — 2580000003 HC RX 258: Performed by: NURSE PRACTITIONER

## 2023-11-23 PROCEDURE — 6360000002 HC RX W HCPCS: Performed by: NURSE PRACTITIONER

## 2023-11-23 PROCEDURE — 99232 SBSQ HOSP IP/OBS MODERATE 35: CPT | Performed by: INTERNAL MEDICINE

## 2023-11-23 PROCEDURE — 1200000000 HC SEMI PRIVATE

## 2023-11-23 PROCEDURE — 83735 ASSAY OF MAGNESIUM: CPT

## 2023-11-23 PROCEDURE — 80053 COMPREHEN METABOLIC PANEL: CPT

## 2023-11-23 PROCEDURE — 99231 SBSQ HOSP IP/OBS SF/LOW 25: CPT | Performed by: STUDENT IN AN ORGANIZED HEALTH CARE EDUCATION/TRAINING PROGRAM

## 2023-11-23 PROCEDURE — 6360000002 HC RX W HCPCS: Performed by: INTERNAL MEDICINE

## 2023-11-23 PROCEDURE — 2580000003 HC RX 258: Performed by: INTERNAL MEDICINE

## 2023-11-23 PROCEDURE — 85025 COMPLETE CBC W/AUTO DIFF WBC: CPT

## 2023-11-23 RX ORDER — POTASSIUM CHLORIDE 7.45 MG/ML
10 INJECTION INTRAVENOUS
Status: DISCONTINUED | OUTPATIENT
Start: 2023-11-23 | End: 2023-11-23

## 2023-11-23 RX ORDER — POTASSIUM CHLORIDE 7.45 MG/ML
10 INJECTION INTRAVENOUS
Status: COMPLETED | OUTPATIENT
Start: 2023-11-23 | End: 2023-11-23

## 2023-11-23 RX ORDER — POTASSIUM CHLORIDE 7.45 MG/ML
10 INJECTION INTRAVENOUS
Status: DISPENSED | OUTPATIENT
Start: 2023-11-23 | End: 2023-11-23

## 2023-11-23 RX ORDER — MAGNESIUM SULFATE IN WATER 40 MG/ML
2000 INJECTION, SOLUTION INTRAVENOUS ONCE
Status: COMPLETED | OUTPATIENT
Start: 2023-11-23 | End: 2023-11-23

## 2023-11-23 RX ADMIN — SODIUM CHLORIDE, PRESERVATIVE FREE 10 ML: 5 INJECTION INTRAVENOUS at 22:07

## 2023-11-23 RX ADMIN — POTASSIUM CHLORIDE 10 MEQ: 7.46 INJECTION, SOLUTION INTRAVENOUS at 15:41

## 2023-11-23 RX ADMIN — MAGNESIUM SULFATE HEPTAHYDRATE 2000 MG: 40 INJECTION, SOLUTION INTRAVENOUS at 12:03

## 2023-11-23 RX ADMIN — SODIUM CHLORIDE: 9 INJECTION, SOLUTION INTRAVENOUS at 12:02

## 2023-11-23 RX ADMIN — PIPERACILLIN AND TAZOBACTAM 3375 MG: 3; .375 INJECTION, POWDER, LYOPHILIZED, FOR SOLUTION INTRAVENOUS at 09:39

## 2023-11-23 RX ADMIN — PIPERACILLIN AND TAZOBACTAM 3375 MG: 3; .375 INJECTION, POWDER, LYOPHILIZED, FOR SOLUTION INTRAVENOUS at 02:35

## 2023-11-23 RX ADMIN — PIPERACILLIN AND TAZOBACTAM 3375 MG: 3; .375 INJECTION, POWDER, LYOPHILIZED, FOR SOLUTION INTRAVENOUS at 19:16

## 2023-11-23 RX ADMIN — ENOXAPARIN SODIUM 40 MG: 100 INJECTION SUBCUTANEOUS at 09:39

## 2023-11-23 RX ADMIN — SODIUM CHLORIDE, PRESERVATIVE FREE 10 ML: 5 INJECTION INTRAVENOUS at 09:39

## 2023-11-23 RX ADMIN — FLUCONAZOLE 400 MG: 400 INJECTION, SOLUTION INTRAVENOUS at 16:47

## 2023-11-23 RX ADMIN — POTASSIUM CHLORIDE 10 MEQ: 7.46 INJECTION, SOLUTION INTRAVENOUS at 14:29

## 2023-11-24 ENCOUNTER — APPOINTMENT (OUTPATIENT)
Dept: CT IMAGING | Age: 48
DRG: 871 | End: 2023-11-24
Payer: COMMERCIAL

## 2023-11-24 LAB
ALBUMIN SERPL-MCNC: 2 G/DL (ref 3.4–5)
ALBUMIN/GLOB SERPL: 0.4 {RATIO} (ref 1.1–2.2)
ALP SERPL-CCNC: 104 U/L (ref 40–129)
ALT SERPL-CCNC: 9 U/L (ref 10–40)
ANION GAP SERPL CALCULATED.3IONS-SCNC: 11 MMOL/L (ref 3–16)
APPEARANCE BRONCH: ABNORMAL
AST SERPL-CCNC: 17 U/L (ref 15–37)
BASOPHILS # BLD: 0 K/UL (ref 0–0.2)
BASOPHILS NFR BLD: 0.3 %
BILIRUB SERPL-MCNC: 0.5 MG/DL (ref 0–1)
BUN SERPL-MCNC: 7 MG/DL (ref 7–20)
CALCIUM SERPL-MCNC: 8.2 MG/DL (ref 8.3–10.6)
CHLORIDE SERPL-SCNC: 101 MMOL/L (ref 99–110)
CLOT SPEC QL: ABNORMAL
CO2 SERPL-SCNC: 27 MMOL/L (ref 21–32)
COLOR BRONCH: ABNORMAL
CREAT SERPL-MCNC: <0.5 MG/DL (ref 0.9–1.3)
DEPRECATED RDW RBC AUTO: 20.9 % (ref 12.4–15.4)
EOSINOPHIL # BLD: 0.1 K/UL (ref 0–0.6)
EOSINOPHIL NFR BLD: 1 %
GFR SERPLBLD CREATININE-BSD FMLA CKD-EPI: >60 ML/MIN/{1.73_M2}
GLUCOSE SERPL-MCNC: 102 MG/DL (ref 70–99)
HCT VFR BLD AUTO: 24.6 % (ref 40.5–52.5)
HGB BLD-MCNC: 7.8 G/DL (ref 13.5–17.5)
INR PPP: 1.2 (ref 0.84–1.16)
LYMPHOCYTES # BLD: 0.7 K/UL (ref 1–5.1)
LYMPHOCYTES NFR BLD: 6.8 %
MACROPHAGES NFR BRONCH MANUAL: 5 % (ref 90–95)
MAGNESIUM SERPL-MCNC: 1.8 MG/DL (ref 1.8–2.4)
MCH RBC QN AUTO: 30.1 PG (ref 26–34)
MCHC RBC AUTO-ENTMCNC: 31.8 G/DL (ref 31–36)
MCV RBC AUTO: 94.5 FL (ref 80–100)
MONOCYTES # BLD: 1 K/UL (ref 0–1.3)
MONOCYTES NFR BLD: 9.3 %
NEUTROPHILS # BLD: 9 K/UL (ref 1.7–7.7)
NEUTROPHILS NFR BLD: 82.6 %
NEUTROPHILS NFR BRONCH MANUAL: 95 % (ref 5–10)
NUC CELL # BRONCH MANUAL: ABNORMAL /CUMM
PLATELET # BLD AUTO: 266 K/UL (ref 135–450)
PMV BLD AUTO: 8 FL (ref 5–10.5)
POTASSIUM SERPL-SCNC: 3.1 MMOL/L (ref 3.5–5.1)
PROT SERPL-MCNC: 6.7 G/DL (ref 6.4–8.2)
PROTHROMBIN TIME: 15.2 SEC (ref 11.5–14.8)
RBC # BLD AUTO: 2.61 M/UL (ref 4.2–5.9)
RBC # FLD MANUAL: 1125 /CUMM
SODIUM SERPL-SCNC: 139 MMOL/L (ref 136–145)
TOTAL CELLS COUNTED BRONCH: 100
WBC # BLD AUTO: 10.8 K/UL (ref 4–11)

## 2023-11-24 PROCEDURE — 0B9C8ZX DRAINAGE OF RIGHT UPPER LUNG LOBE, VIA NATURAL OR ARTIFICIAL OPENING ENDOSCOPIC, DIAGNOSTIC: ICD-10-PCS | Performed by: INTERNAL MEDICINE

## 2023-11-24 PROCEDURE — 88112 CYTOPATH CELL ENHANCE TECH: CPT

## 2023-11-24 PROCEDURE — 3609027000 HC BRONCHOSCOPY: Performed by: INTERNAL MEDICINE

## 2023-11-24 PROCEDURE — 87102 FUNGUS ISOLATION CULTURE: CPT

## 2023-11-24 PROCEDURE — 80053 COMPREHEN METABOLIC PANEL: CPT

## 2023-11-24 PROCEDURE — 87116 MYCOBACTERIA CULTURE: CPT

## 2023-11-24 PROCEDURE — 2709999900 HC NON-CHARGEABLE SUPPLY: Performed by: INTERNAL MEDICINE

## 2023-11-24 PROCEDURE — A4217 STERILE WATER/SALINE, 500 ML: HCPCS | Performed by: INTERNAL MEDICINE

## 2023-11-24 PROCEDURE — 87070 CULTURE OTHR SPECIMN AEROBIC: CPT

## 2023-11-24 PROCEDURE — 3609010900 HC BRONCHOSCOPY THERAPUTIC ASPIRATION INITIAL: Performed by: INTERNAL MEDICINE

## 2023-11-24 PROCEDURE — 6360000002 HC RX W HCPCS: Performed by: INTERNAL MEDICINE

## 2023-11-24 PROCEDURE — 71260 CT THORAX DX C+: CPT

## 2023-11-24 PROCEDURE — 85025 COMPLETE CBC W/AUTO DIFF WBC: CPT

## 2023-11-24 PROCEDURE — 2580000003 HC RX 258: Performed by: INTERNAL MEDICINE

## 2023-11-24 PROCEDURE — 2700000000 HC OXYGEN THERAPY PER DAY

## 2023-11-24 PROCEDURE — 1200000000 HC SEMI PRIVATE

## 2023-11-24 PROCEDURE — 87206 SMEAR FLUORESCENT/ACID STAI: CPT

## 2023-11-24 PROCEDURE — 87632 RESP VIRUS 6-11 TARGETS: CPT

## 2023-11-24 PROCEDURE — 87205 SMEAR GRAM STAIN: CPT

## 2023-11-24 PROCEDURE — 3609010800 HC BRONCHOSCOPY ALVEOLAR LAVAGE: Performed by: INTERNAL MEDICINE

## 2023-11-24 PROCEDURE — 88305 TISSUE EXAM BY PATHOLOGIST: CPT

## 2023-11-24 PROCEDURE — 6360000002 HC RX W HCPCS

## 2023-11-24 PROCEDURE — 31645 BRNCHSC W/THER ASPIR 1ST: CPT | Performed by: INTERNAL MEDICINE

## 2023-11-24 PROCEDURE — 99152 MOD SED SAME PHYS/QHP 5/>YRS: CPT | Performed by: INTERNAL MEDICINE

## 2023-11-24 PROCEDURE — 83735 ASSAY OF MAGNESIUM: CPT

## 2023-11-24 PROCEDURE — 7100000011 HC PHASE II RECOVERY - ADDTL 15 MIN: Performed by: INTERNAL MEDICINE

## 2023-11-24 PROCEDURE — 2500000003 HC RX 250 WO HCPCS: Performed by: INTERNAL MEDICINE

## 2023-11-24 PROCEDURE — 0BC18ZZ EXTIRPATION OF MATTER FROM TRACHEA, VIA NATURAL OR ARTIFICIAL OPENING ENDOSCOPIC: ICD-10-PCS | Performed by: INTERNAL MEDICINE

## 2023-11-24 PROCEDURE — 99232 SBSQ HOSP IP/OBS MODERATE 35: CPT | Performed by: INTERNAL MEDICINE

## 2023-11-24 PROCEDURE — 6360000004 HC RX CONTRAST MEDICATION: Performed by: STUDENT IN AN ORGANIZED HEALTH CARE EDUCATION/TRAINING PROGRAM

## 2023-11-24 PROCEDURE — 31624 DX BRONCHOSCOPE/LAVAGE: CPT | Performed by: INTERNAL MEDICINE

## 2023-11-24 PROCEDURE — 7100000010 HC PHASE II RECOVERY - FIRST 15 MIN: Performed by: INTERNAL MEDICINE

## 2023-11-24 PROCEDURE — 85610 PROTHROMBIN TIME: CPT

## 2023-11-24 PROCEDURE — 99153 MOD SED SAME PHYS/QHP EA: CPT | Performed by: INTERNAL MEDICINE

## 2023-11-24 PROCEDURE — 89051 BODY FLUID CELL COUNT: CPT

## 2023-11-24 RX ORDER — MIDAZOLAM HYDROCHLORIDE 5 MG/ML
INJECTION INTRAMUSCULAR; INTRAVENOUS PRN
Status: DISCONTINUED | OUTPATIENT
Start: 2023-11-24 | End: 2023-11-24 | Stop reason: ALTCHOICE

## 2023-11-24 RX ORDER — ACETYLCYSTEINE 200 MG/ML
SOLUTION ORAL; RESPIRATORY (INHALATION) PRN
Status: DISCONTINUED | OUTPATIENT
Start: 2023-11-24 | End: 2023-11-24 | Stop reason: ALTCHOICE

## 2023-11-24 RX ORDER — FENTANYL CITRATE 50 UG/ML
INJECTION, SOLUTION INTRAMUSCULAR; INTRAVENOUS PRN
Status: DISCONTINUED | OUTPATIENT
Start: 2023-11-24 | End: 2023-11-24 | Stop reason: ALTCHOICE

## 2023-11-24 RX ORDER — LIDOCAINE HYDROCHLORIDE 20 MG/ML
INJECTION, SOLUTION EPIDURAL; INFILTRATION; INTRACAUDAL; PERINEURAL PRN
Status: DISCONTINUED | OUTPATIENT
Start: 2023-11-24 | End: 2023-11-24 | Stop reason: ALTCHOICE

## 2023-11-24 RX ORDER — POTASSIUM CHLORIDE 29.8 MG/ML
20 INJECTION INTRAVENOUS
Status: COMPLETED | OUTPATIENT
Start: 2023-11-24 | End: 2023-11-24

## 2023-11-24 RX ORDER — MAGNESIUM HYDROXIDE 1200 MG/15ML
LIQUID ORAL CONTINUOUS PRN
Status: COMPLETED | OUTPATIENT
Start: 2023-11-24 | End: 2023-11-24

## 2023-11-24 RX ADMIN — POTASSIUM CHLORIDE 20 MEQ: 29.8 INJECTION, SOLUTION INTRAVENOUS at 08:53

## 2023-11-24 RX ADMIN — PIPERACILLIN AND TAZOBACTAM 3375 MG: 3; .375 INJECTION, POWDER, LYOPHILIZED, FOR SOLUTION INTRAVENOUS at 01:40

## 2023-11-24 RX ADMIN — IOPAMIDOL 75 ML: 755 INJECTION, SOLUTION INTRAVENOUS at 09:21

## 2023-11-24 RX ADMIN — PIPERACILLIN AND TAZOBACTAM 3375 MG: 3; .375 INJECTION, POWDER, LYOPHILIZED, FOR SOLUTION INTRAVENOUS at 08:07

## 2023-11-24 RX ADMIN — FLUCONAZOLE 400 MG: 400 INJECTION, SOLUTION INTRAVENOUS at 16:11

## 2023-11-24 RX ADMIN — PIPERACILLIN AND TAZOBACTAM 3375 MG: 3; .375 INJECTION, POWDER, LYOPHILIZED, FOR SOLUTION INTRAVENOUS at 16:09

## 2023-11-24 RX ADMIN — POTASSIUM CHLORIDE 20 MEQ: 29.8 INJECTION, SOLUTION INTRAVENOUS at 08:10

## 2023-11-24 ASSESSMENT — PAIN SCALES - GENERAL
PAINLEVEL_OUTOF10: 0

## 2023-11-24 NOTE — CARE COORDINATION
CM update: LOS # 4; Patient had stent placed in EGD yesterday. Evaluated for need for Chest Tube there was no need for CT from an IR standpoint. Remains on IV ATB therapy. Will follow for any discharge needs. Patient is active with [facility] for TF at home.  Yehuda Yoon RN

## 2023-11-25 LAB
ACID FAST STN SPEC QL: NORMAL
ALBUMIN SERPL-MCNC: 1.7 G/DL (ref 3.4–5)
ALBUMIN/GLOB SERPL: 0.4 {RATIO} (ref 1.1–2.2)
ALP SERPL-CCNC: 118 U/L (ref 40–129)
ALT SERPL-CCNC: 10 U/L (ref 10–40)
ANION GAP SERPL CALCULATED.3IONS-SCNC: 7 MMOL/L (ref 3–16)
AST SERPL-CCNC: 17 U/L (ref 15–37)
BACTERIA BLD CULT ORG #2: NORMAL
BACTERIA BLD CULT: NORMAL
BASOPHILS # BLD: 0.1 K/UL (ref 0–0.2)
BASOPHILS NFR BLD: 0.7 %
BILIRUB SERPL-MCNC: 0.3 MG/DL (ref 0–1)
BUN SERPL-MCNC: 7 MG/DL (ref 7–20)
CALCIUM SERPL-MCNC: 8.1 MG/DL (ref 8.3–10.6)
CHLORIDE SERPL-SCNC: 99 MMOL/L (ref 99–110)
CO2 SERPL-SCNC: 30 MMOL/L (ref 21–32)
CREAT SERPL-MCNC: 0.6 MG/DL (ref 0.9–1.3)
DEPRECATED RDW RBC AUTO: 20.3 % (ref 12.4–15.4)
EOSINOPHIL # BLD: 0.1 K/UL (ref 0–0.6)
EOSINOPHIL NFR BLD: 1 %
GFR SERPLBLD CREATININE-BSD FMLA CKD-EPI: >60 ML/MIN/{1.73_M2}
GLUCOSE SERPL-MCNC: 139 MG/DL (ref 70–99)
HCT VFR BLD AUTO: 22.8 % (ref 40.5–52.5)
HGB BLD-MCNC: 7.2 G/DL (ref 13.5–17.5)
LOEFFLER MB STN SPEC: NORMAL
LYMPHOCYTES # BLD: 0.8 K/UL (ref 1–5.1)
LYMPHOCYTES NFR BLD: 6.1 %
MAGNESIUM SERPL-MCNC: 1.7 MG/DL (ref 1.8–2.4)
MCH RBC QN AUTO: 29.9 PG (ref 26–34)
MCHC RBC AUTO-ENTMCNC: 31.5 G/DL (ref 31–36)
MCV RBC AUTO: 94.9 FL (ref 80–100)
MONOCYTES # BLD: 0.7 K/UL (ref 0–1.3)
MONOCYTES NFR BLD: 5.6 %
NEUTROPHILS # BLD: 11.6 K/UL (ref 1.7–7.7)
NEUTROPHILS NFR BLD: 86.6 %
PLATELET # BLD AUTO: 239 K/UL (ref 135–450)
PMV BLD AUTO: 8 FL (ref 5–10.5)
POTASSIUM SERPL-SCNC: 3.2 MMOL/L (ref 3.5–5.1)
PROT SERPL-MCNC: 6.4 G/DL (ref 6.4–8.2)
RBC # BLD AUTO: 2.4 M/UL (ref 4.2–5.9)
SODIUM SERPL-SCNC: 136 MMOL/L (ref 136–145)
WBC # BLD AUTO: 13.4 K/UL (ref 4–11)

## 2023-11-25 PROCEDURE — 2580000003 HC RX 258: Performed by: INTERNAL MEDICINE

## 2023-11-25 PROCEDURE — 2580000003 HC RX 258: Performed by: NURSE PRACTITIONER

## 2023-11-25 PROCEDURE — 6360000002 HC RX W HCPCS: Performed by: INTERNAL MEDICINE

## 2023-11-25 PROCEDURE — 85025 COMPLETE CBC W/AUTO DIFF WBC: CPT

## 2023-11-25 PROCEDURE — 99232 SBSQ HOSP IP/OBS MODERATE 35: CPT | Performed by: INTERNAL MEDICINE

## 2023-11-25 PROCEDURE — 6360000002 HC RX W HCPCS: Performed by: NURSE PRACTITIONER

## 2023-11-25 PROCEDURE — 1200000000 HC SEMI PRIVATE

## 2023-11-25 PROCEDURE — 83735 ASSAY OF MAGNESIUM: CPT

## 2023-11-25 PROCEDURE — 80053 COMPREHEN METABOLIC PANEL: CPT

## 2023-11-25 RX ORDER — POTASSIUM CHLORIDE 7.45 MG/ML
10 INJECTION INTRAVENOUS
Status: COMPLETED | OUTPATIENT
Start: 2023-11-25 | End: 2023-11-25

## 2023-11-25 RX ORDER — MAGNESIUM SULFATE IN WATER 40 MG/ML
2000 INJECTION, SOLUTION INTRAVENOUS ONCE
Status: COMPLETED | OUTPATIENT
Start: 2023-11-25 | End: 2023-11-25

## 2023-11-25 RX ADMIN — POTASSIUM CHLORIDE 10 MEQ: 7.46 INJECTION, SOLUTION INTRAVENOUS at 13:47

## 2023-11-25 RX ADMIN — PIPERACILLIN AND TAZOBACTAM 3375 MG: 3; .375 INJECTION, POWDER, LYOPHILIZED, FOR SOLUTION INTRAVENOUS at 18:55

## 2023-11-25 RX ADMIN — SODIUM CHLORIDE, PRESERVATIVE FREE 10 ML: 5 INJECTION INTRAVENOUS at 09:54

## 2023-11-25 RX ADMIN — POTASSIUM CHLORIDE 10 MEQ: 7.46 INJECTION, SOLUTION INTRAVENOUS at 14:48

## 2023-11-25 RX ADMIN — MAGNESIUM SULFATE HEPTAHYDRATE 2000 MG: 40 INJECTION, SOLUTION INTRAVENOUS at 14:45

## 2023-11-25 RX ADMIN — FLUCONAZOLE 400 MG: 400 INJECTION, SOLUTION INTRAVENOUS at 17:37

## 2023-11-25 RX ADMIN — PIPERACILLIN AND TAZOBACTAM 3375 MG: 3; .375 INJECTION, POWDER, LYOPHILIZED, FOR SOLUTION INTRAVENOUS at 01:05

## 2023-11-25 RX ADMIN — POTASSIUM CHLORIDE 10 MEQ: 7.46 INJECTION, SOLUTION INTRAVENOUS at 17:39

## 2023-11-25 RX ADMIN — PIPERACILLIN AND TAZOBACTAM 3375 MG: 3; .375 INJECTION, POWDER, LYOPHILIZED, FOR SOLUTION INTRAVENOUS at 09:56

## 2023-11-25 RX ADMIN — ENOXAPARIN SODIUM 40 MG: 100 INJECTION SUBCUTANEOUS at 09:56

## 2023-11-25 RX ADMIN — POTASSIUM CHLORIDE 10 MEQ: 7.46 INJECTION, SOLUTION INTRAVENOUS at 15:55

## 2023-11-25 ASSESSMENT — PAIN SCALES - GENERAL
PAINLEVEL_OUTOF10: 0
PAINLEVEL_OUTOF10: 0

## 2023-11-25 NOTE — PRE SEDATION
Sedation Pre-Procedure Note    Patient Name: Luis Olivares   YOB: 1975  Room/Bed: 0350/0350-01  Medical Record Number: [id number]  Date: 11/24/2023   Time: 8:36 PM       Indication: Patient with history of esophageal malignancy with esophageal rupture status post stent placement with lung consolidation and possibility of tracheoesophageal fistula    Consent: I have discussed with the patient and/or the patient representative the indication, alternatives, and the possible risks and/or complications of the planned procedure and the anesthesia methods. The patient and/or patient representative appear to understand and agree to proceed. Vital Signs:   Vitals:    11/24/23 1459   BP:    Pulse:    Resp:    Temp:    SpO2: 95%       Past Medical History:   has a past medical history of Cancer ([address]), Dysphagia, Esophageal cancer ([address]), Hypertension, Neoplasm, soft palate, and Weight loss. Past Surgical History:   has a past surgical history that includes cyst removal; Esophagogastroduodenoscopy; Neck lesion biopsy; Stomach surgery (N/A, 5/3/2023); [address] Surgery (N/A, 5/3/2023); Upper gastrointestinal endoscopy (N/A, 11/21/2023); bronchoscopy (N/A, 11/24/2023); bronchoscopy (11/24/2023); and bronchoscopy (11/24/2023). Medications:   Scheduled Meds:    piperacillin-tazobactam  3,375 mg IntraVENous Q8H    fluconazole  400 mg IntraVENous Q24H    sodium chloride flush  5-40 mL IntraVENous 2 times per day    enoxaparin  40 mg SubCUTAneous Daily     Continuous Infusions:    sodium chloride 5 mL/hr at 11/23/23 1202    sodium chloride       PRN Meds: sodium chloride flush, sodium chloride, acetaminophen **OR** acetaminophen, diatrizoate meglumine-sodium, diatrizoate meglumine-sodium, sodium chloride  Home Meds:   Prior to Admission medications    Medication Sig Start Date End Date Taking?  Authorizing Provider   metoprolol succinate (TOPROL XL) 50 MG extended release tablet TAKE 1 TABLET BY MOUTH DAILY 6/26/23

## 2023-11-25 NOTE — PROCEDURES
Bronchoscopy note      Patient with history of esophageal malignancy with esophageal rupture status post stent placement with lung consolidation possibly of acute esophageal fistula and given the patient clinical status and as such patient by CT surgery and it was decided to do a bronchoscopy for diagnostic and therapeutic purposes and for that reason after informed consent, the patient was taken to the endoscopy suite, patient was given oropharyngeal analgesia with benzocaine after timeout, patient was given tracheobronchomalacia with lidocaine, patient was given conscious sedation with 5 mg of Versed and 100 mcg of fentanyl as following-    Physician/patient of face-to-face sedation start time was 8:20 AM  Physician/patient face-to-face sedation stop time was 8:36 AM  Total moderate sedation time in minutes was 16 minutes  The patient was monitored continuously  throughout the entire procedure while the sedation was being administered    The bronchoscope was introduced to the mouth using a bite block, patient was found to have extensive thick mucoid secretions in the posterior pharynx which had to be removed a piece by piece using saline and lidocaine, patient was found to have slight thinning of the right vocal cord along with that patient also has some petechial hemorrhaging on the vocal cords, patient had minimal adduction deformity of the vocal cords, on advancing the bronchoscope further patient was found to have thick mucous plugs in the trachea which were suctioned out, patient also had entire right tracheobronchial tree was full of thick mucous plugs along with left lower lobe which was also thick mucous plugs which were purulent in nature along with that patient had some food particles in the right lower lobe, patient was also found to have a nodular lesion near the right upper lobe bronchus which was somewhat friable, the bronchoscope was wedged into the right upper lobe bronchus and BAL was done from

## 2023-11-26 ENCOUNTER — APPOINTMENT (OUTPATIENT)
Dept: GENERAL RADIOLOGY | Age: 48
DRG: 871 | End: 2023-11-26
Payer: COMMERCIAL

## 2023-11-26 LAB
ADENOVIRUS PCR: NOT DETECTED
ALBUMIN SERPL-MCNC: 1.8 G/DL (ref 3.4–5)
ALBUMIN/GLOB SERPL: 0.4 {RATIO} (ref 1.1–2.2)
ALP SERPL-CCNC: 120 U/L (ref 40–129)
ALT SERPL-CCNC: 12 U/L (ref 10–40)
ANION GAP SERPL CALCULATED.3IONS-SCNC: 7 MMOL/L (ref 3–16)
AST SERPL-CCNC: 21 U/L (ref 15–37)
BACTERIA SPEC RESP CULT: NORMAL
BASOPHILS # BLD: 0 K/UL (ref 0–0.2)
BASOPHILS NFR BLD: 0.3 %
BILIRUB SERPL-MCNC: 0.3 MG/DL (ref 0–1)
BUN SERPL-MCNC: 7 MG/DL (ref 7–20)
CALCIUM SERPL-MCNC: 8 MG/DL (ref 8.3–10.6)
CHLORIDE SERPL-SCNC: 99 MMOL/L (ref 99–110)
CO2 SERPL-SCNC: 28 MMOL/L (ref 21–32)
CREAT SERPL-MCNC: <0.5 MG/DL (ref 0.9–1.3)
DEPRECATED RDW RBC AUTO: 20.2 % (ref 12.4–15.4)
EOSINOPHIL # BLD: 0.2 K/UL (ref 0–0.6)
EOSINOPHIL NFR BLD: 1.8 %
GFR SERPLBLD CREATININE-BSD FMLA CKD-EPI: >60 ML/MIN/{1.73_M2}
GLUCOSE SERPL-MCNC: 119 MG/DL (ref 70–99)
GRAM STN SPEC: NORMAL
HCT VFR BLD AUTO: 22.5 % (ref 40.5–52.5)
HCT VFR BLD AUTO: 23.1 % (ref 40.5–52.5)
HGB BLD-MCNC: 7.1 G/DL (ref 13.5–17.5)
HGB BLD-MCNC: 7.2 G/DL (ref 13.5–17.5)
HUMAN METAPNEUMOVIRUS PCR: NOT DETECTED
INFLUENZA A: NOT DETECTED
INFLUENZA B: NOT DETECTED
LYMPHOCYTES # BLD: 0.8 K/UL (ref 1–5.1)
LYMPHOCYTES NFR BLD: 6.8 %
MCH RBC QN AUTO: 30 PG (ref 26–34)
MCHC RBC AUTO-ENTMCNC: 31.6 G/DL (ref 31–36)
MCV RBC AUTO: 94.8 FL (ref 80–100)
MONOCYTES # BLD: 0.7 K/UL (ref 0–1.3)
MONOCYTES NFR BLD: 6.2 %
NEUTROPHILS # BLD: 10.1 K/UL (ref 1.7–7.7)
NEUTROPHILS NFR BLD: 84.9 %
PARAINFLUENZA 1 PCR: NOT DETECTED
PARAINFLUENZA 2 PCR: NOT DETECTED
PARAINFLUENZA 3 PCR: NOT DETECTED
PARAINFLUENZA 4 PCR: NOT DETECTED
PLATELET # BLD AUTO: 227 K/UL (ref 135–450)
PMV BLD AUTO: 7.8 FL (ref 5–10.5)
POTASSIUM SERPL-SCNC: 3.7 MMOL/L (ref 3.5–5.1)
PROT SERPL-MCNC: 6.6 G/DL (ref 6.4–8.2)
RBC # BLD AUTO: 2.37 M/UL (ref 4.2–5.9)
RHINO/ENTEROVIRUS PCR: NOT DETECTED
RSV BY PCR: NOT DETECTED
RSV SOURCE: NORMAL
SODIUM SERPL-SCNC: 134 MMOL/L (ref 136–145)
WBC # BLD AUTO: 11.9 K/UL (ref 4–11)

## 2023-11-26 PROCEDURE — 6360000002 HC RX W HCPCS: Performed by: NURSE PRACTITIONER

## 2023-11-26 PROCEDURE — 6360000002 HC RX W HCPCS: Performed by: INTERNAL MEDICINE

## 2023-11-26 PROCEDURE — 80053 COMPREHEN METABOLIC PANEL: CPT

## 2023-11-26 PROCEDURE — 2580000003 HC RX 258: Performed by: INTERNAL MEDICINE

## 2023-11-26 PROCEDURE — 74018 RADEX ABDOMEN 1 VIEW: CPT

## 2023-11-26 PROCEDURE — 99232 SBSQ HOSP IP/OBS MODERATE 35: CPT | Performed by: INTERNAL MEDICINE

## 2023-11-26 PROCEDURE — 1200000000 HC SEMI PRIVATE

## 2023-11-26 PROCEDURE — 6360000004 HC RX CONTRAST MEDICATION: Performed by: THORACIC SURGERY (CARDIOTHORACIC VASCULAR SURGERY)

## 2023-11-26 PROCEDURE — 85025 COMPLETE CBC W/AUTO DIFF WBC: CPT

## 2023-11-26 PROCEDURE — 36592 COLLECT BLOOD FROM PICC: CPT

## 2023-11-26 PROCEDURE — 85018 HEMOGLOBIN: CPT

## 2023-11-26 PROCEDURE — 85014 HEMATOCRIT: CPT

## 2023-11-26 PROCEDURE — 2580000003 HC RX 258: Performed by: NURSE PRACTITIONER

## 2023-11-26 RX ADMIN — SODIUM CHLORIDE, PRESERVATIVE FREE 10 ML: 5 INJECTION INTRAVENOUS at 20:02

## 2023-11-26 RX ADMIN — DIATRIZOATE MEGLUMINE AND DIATRIZOATE SODIUM 125 ML: 660; 100 LIQUID ORAL; RECTAL at 12:48

## 2023-11-26 RX ADMIN — PIPERACILLIN AND TAZOBACTAM 3375 MG: 3; .375 INJECTION, POWDER, LYOPHILIZED, FOR SOLUTION INTRAVENOUS at 00:08

## 2023-11-26 RX ADMIN — ENOXAPARIN SODIUM 40 MG: 100 INJECTION SUBCUTANEOUS at 08:22

## 2023-11-26 RX ADMIN — PIPERACILLIN AND TAZOBACTAM 3375 MG: 3; .375 INJECTION, POWDER, LYOPHILIZED, FOR SOLUTION INTRAVENOUS at 17:13

## 2023-11-26 RX ADMIN — FLUCONAZOLE 400 MG: 400 INJECTION, SOLUTION INTRAVENOUS at 17:11

## 2023-11-26 RX ADMIN — PIPERACILLIN AND TAZOBACTAM 3375 MG: 3; .375 INJECTION, POWDER, LYOPHILIZED, FOR SOLUTION INTRAVENOUS at 08:22

## 2023-11-27 VITALS
WEIGHT: 135.8 LBS | OXYGEN SATURATION: 94 % | BODY MASS INDEX: 17.43 KG/M2 | RESPIRATION RATE: 16 BRPM | HEART RATE: 111 BPM | TEMPERATURE: 98.1 F | SYSTOLIC BLOOD PRESSURE: 108 MMHG | DIASTOLIC BLOOD PRESSURE: 70 MMHG | HEIGHT: 74 IN

## 2023-11-27 LAB
ALBUMIN SERPL-MCNC: 1.9 G/DL (ref 3.4–5)
ALBUMIN/GLOB SERPL: 0.4 {RATIO} (ref 1.1–2.2)
ALP SERPL-CCNC: 107 U/L (ref 40–129)
ALT SERPL-CCNC: 12 U/L (ref 10–40)
ANION GAP SERPL CALCULATED.3IONS-SCNC: 9 MMOL/L (ref 3–16)
AST SERPL-CCNC: 22 U/L (ref 15–37)
BILIRUB SERPL-MCNC: 0.4 MG/DL (ref 0–1)
BUN SERPL-MCNC: 6 MG/DL (ref 7–20)
CALCIUM SERPL-MCNC: 9.3 MG/DL (ref 8.3–10.6)
CHLORIDE SERPL-SCNC: 97 MMOL/L (ref 99–110)
CO2 SERPL-SCNC: 27 MMOL/L (ref 21–32)
CREAT SERPL-MCNC: 0.6 MG/DL (ref 0.9–1.3)
DEPRECATED RDW RBC AUTO: 20.3 % (ref 12.4–15.4)
GFR SERPLBLD CREATININE-BSD FMLA CKD-EPI: >60 ML/MIN/{1.73_M2}
GLUCOSE SERPL-MCNC: 83 MG/DL (ref 70–99)
HCT VFR BLD AUTO: 25 % (ref 40.5–52.5)
HGB BLD-MCNC: 7.9 G/DL (ref 13.5–17.5)
MCH RBC QN AUTO: 29.8 PG (ref 26–34)
MCHC RBC AUTO-ENTMCNC: 31.7 G/DL (ref 31–36)
MCV RBC AUTO: 94 FL (ref 80–100)
PLATELET # BLD AUTO: 261 K/UL (ref 135–450)
PMV BLD AUTO: 8 FL (ref 5–10.5)
POTASSIUM SERPL-SCNC: 3.9 MMOL/L (ref 3.5–5.1)
PROT SERPL-MCNC: 7.2 G/DL (ref 6.4–8.2)
RBC # BLD AUTO: 2.66 M/UL (ref 4.2–5.9)
SODIUM SERPL-SCNC: 133 MMOL/L (ref 136–145)
WBC # BLD AUTO: 11.1 K/UL (ref 4–11)

## 2023-11-27 PROCEDURE — 2580000003 HC RX 258: Performed by: NURSE PRACTITIONER

## 2023-11-27 PROCEDURE — 99232 SBSQ HOSP IP/OBS MODERATE 35: CPT | Performed by: INTERNAL MEDICINE

## 2023-11-27 PROCEDURE — 85027 COMPLETE CBC AUTOMATED: CPT

## 2023-11-27 PROCEDURE — 2580000003 HC RX 258: Performed by: INTERNAL MEDICINE

## 2023-11-27 PROCEDURE — 99231 SBSQ HOSP IP/OBS SF/LOW 25: CPT | Performed by: NURSE PRACTITIONER

## 2023-11-27 PROCEDURE — 80053 COMPREHEN METABOLIC PANEL: CPT

## 2023-11-27 PROCEDURE — 6360000002 HC RX W HCPCS: Performed by: NURSE PRACTITIONER

## 2023-11-27 PROCEDURE — 6360000002 HC RX W HCPCS: Performed by: INTERNAL MEDICINE

## 2023-11-27 RX ORDER — LEVOFLOXACIN 750 MG/1
750 TABLET, FILM COATED ORAL DAILY
Qty: 7 TABLET | Refills: 0 | Status: ON HOLD | OUTPATIENT
Start: 2023-11-27 | End: 2023-11-29 | Stop reason: HOSPADM

## 2023-11-27 RX ORDER — METRONIDAZOLE 500 MG/1
500 TABLET ORAL 3 TIMES DAILY
Qty: 21 TABLET | Refills: 0 | Status: ON HOLD | OUTPATIENT
Start: 2023-11-27 | End: 2023-11-29 | Stop reason: HOSPADM

## 2023-11-27 RX ADMIN — PIPERACILLIN AND TAZOBACTAM 3375 MG: 3; .375 INJECTION, POWDER, LYOPHILIZED, FOR SOLUTION INTRAVENOUS at 00:33

## 2023-11-27 RX ADMIN — PIPERACILLIN AND TAZOBACTAM 3375 MG: 3; .375 INJECTION, POWDER, LYOPHILIZED, FOR SOLUTION INTRAVENOUS at 09:50

## 2023-11-27 RX ADMIN — SODIUM CHLORIDE, PRESERVATIVE FREE 10 ML: 5 INJECTION INTRAVENOUS at 09:44

## 2023-11-27 RX ADMIN — SODIUM CHLORIDE: 9 INJECTION, SOLUTION INTRAVENOUS at 09:47

## 2023-11-27 RX ADMIN — ENOXAPARIN SODIUM 40 MG: 100 INJECTION SUBCUTANEOUS at 09:50

## 2023-11-27 ASSESSMENT — PAIN SCALES - GENERAL
PAINLEVEL_OUTOF10: 0
PAINLEVEL_OUTOF10: 0

## 2023-11-27 NOTE — CONSULTS
21 [facility] Continence Nurse  Consult Note       NAME:  Emigdio Porter RECORD NUMBER:  [id number]  AGE: 46 y.o. GENDER: male  : 1975  TODAY'S DATE:  2023    Subjective; It has been leaking around tube. Reason for WOCN Evaluation and Assessment:     J-tube eval/recs         Naomi Hampton is a 46 y.o. male referred by:   [] Physician  [x] Nursing  [] Other:     Wound Identification:  Wound Type: drain site J-tube  Contributing Factors: HTN    Wound History: 2023 Dr. Concha Jolley  EGD stent placement  Current Wound Care Treatment:  dry gauze    Patient Goal of Care:  [x] Wound Healing  [] Odor Control  [] Palliative Care  [x] Pain Control   [] Other:         PAST MEDICAL HISTORY        Diagnosis Date    Cancer [facility])     lymphoma    Dysphagia     Esophageal cancer ([address] [facility]) 2023    squamous cell carcinoma    Hypertension     Neoplasm, soft palate     Weight loss        PAST SURGICAL HISTORY    Past Surgical History:   Procedure Laterality Date    CYST REMOVAL      @ 10years old, led to Non-Hodgkin's diagnosis    ESOPHAGOGASTRODUODENOSCOPY      NECK LESION BIOPSY      PORT SURGERY N/A 5/3/2023    SURGICAL PORT PLACEMENT performed by Cynthia Bass MD at [address] [place] [address] 5/3/2023    [address]OTIC [address] performed by Cynthia Bass MD at [facility] 2023    EGD STENT PLACEMENT performed by Reji Danielson DO at [address]    Family History   Problem Relation Age of Onset    Cancer Father     Diabetes Father     High Blood Pressure Father        SOCIAL HISTORY    Social History     Tobacco Use    Smoking status: Some Days     Packs/day: 0.20     Years: 15.00     Additional pack years: 0.00     Total pack years: 3.00     Types: Cigarettes    Smokeless tobacco: Never    Tobacco comments:      With anxiety   Vaping Use    Vaping Use: Never used   Substance Use Topics    Alcohol
Alexis Landeros is a 46 y.o. male asked to see us in consultation by  Rei Pappas MD & Gayle Rodriguez MD for evaluation of ? Esophageal perforation. Mr. Christie Vásquez is a 46year old male with past medical history of non hodgkins lymphoma, hodgkins lymphoma, oropharyngeal squamous cell carcinoma, and current esophageal cancer  who presents to the hospital with possible esophageal tear or perforation noted on outpatient PET scan. He follows with Dr. Naomi Erickson for dysphagia and has undergone several UES dilations due to narrowing. The patient has a peg tube for nutrition. Says he has not been able to eat since last Easter. He last underwent endoscopy on 11/15- stricture at the cricopharyngeus and upper third of  the esophagus. The scope traversed the lesion after dilation. Additional  findings include friability. A 36 FR savary type dilator was introduced for  dilation and the diameter was progressively increased to 42 FR. A mass of malignant appearance was found in the middle third  of the esophagus and upper third of the esophagus at 23 to 30 cm. The patient underwent a pet scan yesterday that notes tubular extension of FDG uptake from the lower portion of the esophagus to the right with mediastinal air and mediastinitis. He was directed to ER. CT chest obtained in ER reads diffusely abnormal appearing proximal esophagus without contrast extravasation, pneumonia and lung abscesses RUL a right periilar mass (not appreciated on peg scan) and pulmonary nodules. A barium esophagram was aborted as the patient was aspirating. His white count is normal.  He is afebrile. He was anemic on admission with Hgb of 6.8. The patient denies chest pain, nausea and vomiting. Following his esophogeal dilation, he also denies these symptoms. He has been evaluated by CTS. Plan:  1.
Brief Nutrition Assessment    RD received consult for Tube feed order management. Dietitians following with nutrition assessment and recommendations in RD progress notes. Spoke to [place], plans to start TF today. Recommendationsi ncluded. Will continue to monitor per nutrition standards of care. Nutrition Recommendations/Plan:   Initiate Jevity 1.5 (standard with fiber formula) at 15 mL/hr and as tolerated, increase by 15 mL/hr q 4 hours until goal of 65 mL/hr. Recommend 60 mL H20 q 4 hours. Recommend reevaluate IV fluids at this time. Increase flush if Na+ increases greater than 145 mEq/L. Monitor for tolerance (bowel habits, N/V, cramping, abdominal exam findings: distended, firm, tense, guarded, discomfort). Current Nutrition Intake & Therapies:    Average Meal Intake: NPO  Average Supplements Intake: NPO  Diet NPO  Current Tube Feeding (TF) Orders:  Feeding Route: Jejunostomy  Formula: Standard with Fiber  Schedule: Continuous  Goal TF & Flush Orders Provides: Jevity 1.5 at goal rate of 65 ml/hr x20 hrs to provide 1300 ml TV, 1950 kcals, 83 g protein, 988 ml free water. Water flushes 60 ml q 4 hrs.     Estimated Daily Nutrient Needs:  Energy Requirements Based On: Kcal/kg (30-35)  Weight Used for Energy Requirements: Current (61 kg)  Energy (kcal/day): 7262-8093 kcals  Weight Used for Protein Requirements: Current (1.2-1.5)  Protein (g/day): 73-92 g  Method Used for Fluid Requirements: 1 ml/kcal  Fluid (ml/day): 5180-6627 ml      Jorgito Parsons MS, RD, LD  Contact: Office: [phone]; [address] [place] [address] Road: [phone]
Comprehensive Nutrition Assessment    Type and Reason for Visit:  Initial, Consult, Positive Nutrition Screen    Nutrition Recommendations/Plan:   NPO   As medically feasible: Recommend order \"Diet: Adult Tube Feed\". Initiate Jevity 1.5 (standard with fiber formula) at 15 mL/hr and as tolerated, increase by 15 mL/hr q 4 hours until goal of 65 mL/hr. Recommend 60 mL H20 q 4 hours. Recommend reevaluate IV fluids at this time. Increase flush if Na+ increases greater than 145 mEq/L. Monitor for tolerance (bowel habits, N/V, cramping, abdominal exam findings: distended, firm, tense, guarded, discomfort). Malnutrition Assessment:  Malnutrition Status: At risk for malnutrition (Comment) (11/21/23 1450)    Context:  Chronic Illness     Findings of the 6 clinical characteristics of malnutrition:  Energy Intake:  Mild decrease in energy intake (Comment)  Weight Loss:  Greater than 10% over 6 months       Nutrition Assessment:    Consult for TF order and management/wounds and +IP for home TF: 47 yo M w pmh lymphoma, esophageal cancer on chemotherapy, dysphagia, J-tube placement 5/3/2023, HTN presents after PET scan showed possible tear in esophagus. Pt currently NPO. GI consulted. Pt reports having nocturnal feeds of osmolite at 100 ml/hr x11 hrs with 40 ml water flushes q 1 hrs. Reports tolerating TF, denies N/V/abdominal pain, diarrhea. Reports not eating anything by mouth. Reports a UBW of 187 lbs from February and having gradual weight loss since then. Significant weight loss noted in past 6 months per EMR (-13.2% BW). Spoke with nurse, unclear of surgical intervention at this time, no current plans to start TF yet. As medically feasible, recommend starting with continuous feeds and modifying rate to better meet calorie and protein needs. TF recommendations provided. Will monitor. Nutrition Related Findings:    J-tube. Na 137. K 3.4. Mg 1.7. x1 BM 11/20, active BS.  Wound Type: Pressure Injury, Wound Consult
Consult Call Back    Who:Blank Saini, [place]   Date:11/21/2023,  Time:2:03 PM    Electronically signed by Sangeeta Martell on 11/21/23 at 2:03 PM EST
Consult Call Back    Who:Caleb  Date:11/27/2023,  Time:10:55 AM    Electronically signed by Shahana Segura on 11/27/23 at 10:55 AM EST
Consult Call Back    Who:Denys Wheeler, APRN - CNP   Date:11/21/2023,  Time:12:44 PM    Electronically signed by Stewart Rodgers on 11/21/23 at 12:44 PM EST
Consult Call Back    Who:He   Date:11/23/2023,  Time:7:29 AM    Electronically signed by Domenico Barbour on 11/23/23 at 7:29 AM EST
Consult Call Back    Who:Zully Bhatti MD   Date:11/21/2023,  Time:9:59 AM    Electronically signed by Brandee Muhammad on 11/21/23 at 9:59 AM EST
Consult Placed     Who: Aguila Abel  Date: 11/27/2023   Time: 9:14     Electronically signed by Valentin Magdaleno on 11/27/2023 at 9:13 AM
Consult Placed     Who: Dr. Cam Kang  Date:11/21/2023    Time:10:37AM     Electronically signed by Benji Manzo on 11/21/2023 at 10:37 AM
Consult Placed     Who: Dr. Kristen Schmid  Date:11/21/2023    UGTO:[id number]     Electronically signed by Deedee Cai on 11/21/2023 at 8:41 AM
Consult Placed   Consult sent via perfect serve  Who: Chip Valencia  Date:11/21/2023    KVKX:[id number]     Electronically signed by Erwin Cee on 11/21/2023 at 7:48 AM
Consult Placed   Consult sent via perfect serve  Who: Diandra Espana  QKNS:[id number]    Time:10:32AM     Electronically signed by Brandee Muhammad on 11/21/2023 at 10:32 AM
Consult Placed   Consult sent via perfect serve  Who: Dr. Alexis Fuentes  Date:11/21/2023    Time:07:52AM     Electronically signed by Keisha Armendariz on 11/21/2023 at 7:52 AM
Consult Placed   Consult sent via perfect serve  Who: Dr. Ina Chavez  Date:11/22/2023    Time: 09:02AM     Electronically signed by Nadia Moody on 11/22/2023 at 9:02 AM
Infectious Diseases   Consult Note      Reason for Consult:  lung abscess  Requesting Physician: Dr. Gifty Paulino     Date of Admission: 11/20/2023  Subjective:   CHIEF COMPLAINT: possible esophageal tear     HPI:   49-year-old man with a history of hypertension, HL status postchemotherapy in 1998, NHL-neck status post chemotherapy, oropharyngeal squamous cell carcinoma status post chemo and radiation and active esophageal cancer undergoing chemotherapy who presented on 11/20 after seeing a oncologist and having a surveillance PET scan concerning for esophageal rupture. Pulm and CT surge was consulted and there did appear to be an extrapulmonary abscess. Planning to have IR place a chest tube. He has no fever or leukocytosis, started on vanco and cefepime. Sputum cx pos for Klebsiella. Current abx: vanco, cefepime. Past Surgical History:       Diagnosis Date    Cancer [facility])     lymphoma    Dysphagia     Esophageal cancer ([address]) 04/26/2023    squamous cell carcinoma    Hypertension     Neoplasm, soft palate     Weight loss          Procedure Laterality Date    CYST REMOVAL      @ 10years old, led to Non-Hodgkin's diagnosis    ESOPHAGOGASTRODUODENOSCOPY      NECK LESION BIOPSY      PORT SURGERY N/A 5/3/2023    SURGICAL PORT PLACEMENT performed by Ben Alvarez MD at [address] [facility] 5/3/2023    [address]OT[address] performed by Ben Alvarez MD at [address] [facility] 11/21/2023    EGD STENT PLACEMENT performed by Andrea Haywood DO at [address] [facility] History:    TOBACCO:   reports that he has been smoking cigarettes. He has a 3.00 pack-year smoking history. He has never used smokeless tobacco.  ETOH:   reports that he does not currently use alcohol. There is no history of illicit drug use or other significant epidemiologic exposures.     Family History:       Problem Relation Age of Onset    Cancer Father
Oncology Hematology Care    Consult Note      Requesting Physician:  Marybel Wolf    CHIEF COMPLAINT:  undergoing chemo for esophageal cancer, acute anemia       HISTORY OF PRESENT ILLNESS:    Ashok Bills is a 46year old male with PMHX non hodgkins lymphoma, hodgkins lymphoma, oropharyngeal squamous cell carcinoma, and current esophageal cancer  who presented to [facility] ED on 11/20 as instructed by IR after PET scan showed possible tear in the esophagus. Hem/onc consulted for esophageal cancer patient with acute anemia. Followed by Dr. Kyle Amos at [facility]. Patient had PET scan and IR discussed case with oncology and ultimately agreed patient needed to be seen in the ER for possible esophageal perforation. Patient denies abnormal stool color, hematemesis, SOB, chest pain. Mr. Kinza Garcia  is a 46 y.o. male we are seeing in consultation for     ICD-10-CM    1. Normocytic anemia  D64.9       2. Malignant neoplasm of esophagus, unspecified location ([address])  C15.9       3. Pneumonia of right lung due to infectious organism, unspecified part of lung  J18.9       4.  Empyema ([address])  J86.9              Past Medical History:  Past Medical History:   Diagnosis Date    Cancer ([address])     lymphoma    Dysphagia     Esophageal cancer ([address]) 04/26/2023    squamous cell carcinoma    Hypertension     Neoplasm, soft palate     Weight loss        Past Surgical History:  Past Surgical History:   Procedure Laterality Date    CYST REMOVAL      @ 10years old, led to Non-Hodgkin's diagnosis    ESOPHAGOGASTRODUODENOSCOPY      NECK LESION BIOPSY      PORT SURGERY N/A 5/3/2023    SURGICAL PORT PLACEMENT performed by Jake Thornton MD at [address] [place] [address] 5/3/2023    ROBOTIC JEJUNOSTOMY TUBE PLACEMENT performed by Jake Thornton MD at [facility] OR       Current Medications:  Current Facility-Administered
Pharmacy to Dose Vancomycin    Dx: Sepsis  Goal AUC = 400-600  Pt wt = 61.8 kg  Recent Labs     11/20/23 1952   CREATININE 0.6*     Recent Labs     11/20/23 1952   WBC 6.3     Regimen: 1000 mg IV every 8 hours. Start time: 00:30 on 11/21/2023  Exposure target: AUC24 (range)400-600 mg/L.hr   AUC24,ss: 504 mg/L.hr  Probability of AUC24 > 400: 75 %  Ctrough,ss: 12.7 mg/L  Probability of Ctrough,ss > 20: 17 %  Probability of nephrotoxicity (Lodise TESFAYE 2009): 8 %  Vancomycin trough: 11/21 0800  Ulysses Speedy, [facility] 11/21/2023 12:30 AM      Vancomycin Day of Therapy 1  Micro: BC pending, MRSA DNA Nasal Probe negative   Current Dosing Method: Bayesian-Guided AUC Dosing  Therapeutic Goal: -600 mg/L*hr  Recent Labs     11/20/23 1952 11/21/23  0650 11/21/23  1226   VANCOTROUGH  --   --  21.2*   WBC 6.3 6.9  --    CREATININE 0.6* <0.5*  --    Estimated Creatinine Clearance: 153 mL/min (based on SCr of 0.5 mg/dL). Current Dose / Plan:   Vancomycin 1000 mg IV q8h. Kinetics predict an AUC = 502 mg/L*h with an estimated steady-state vancomycin trough = 12.3 mcg/mL  Will continue to monitor clinical condition and make adjustments to regimen as appropriate. Lesvia Mcpherson, Phan    11/21/2023 1:29 PM     Vancomycin Day of Therapy 2  Micro: BC negative, MRSA DNA Nasal Probe negative   Current Dosing Method: Bayesian-Guided AUC Dosing  Therapeutic Goal: -600 mg/L*hr  Recent Labs     11/20/23 1952 11/21/23  0650 11/21/23  1226 11/22/23  0610   VANCOTROUGH  --   --  21.2*  --    WBC 6.3 6.9  --  10.8   CREATININE 0.6* <0.5*  --  <0.5*   Estimated Creatinine Clearance: 163 mL/min (based on SCr of 0.5 mg/dL). Current Dose / Plan:   Vancomycin 1000 mg IV q8h. Kinetics predict an AUC = 504 mg/L*h with an estimated steady-state vancomycin trough = 12.4 mcg/mL  Next Vancomycin level ordered for 11/23 with AM labs. Will continue to monitor clinical condition and make adjustments to regimen as appropriate.   Shea Miranda
Went to see patient and jtube currently unclogged. Flushed at bedside and ok to use.     Chilango Blackwood
Chest:      Chest wall: No tenderness. Abdominal:      General: Abdomen is flat. Bowel sounds are normal. There is no distension. Tenderness: There is no abdominal tenderness. There is no guarding. Musculoskeletal:         General: No swelling or tenderness. Normal range of motion. Cervical back: Normal range of motion and neck supple. No rigidity. Comments: Positive clubbing   Skin:     General: Skin is warm and dry. Coloration: Skin is not jaundiced. Neurological:      General: No focal deficit present. Mental Status: He is alert and oriented to person, place, and time. Mental status is at baseline. Cranial Nerves: No cranial nerve deficit. Sensory: No sensory deficit. Motor: No weakness. Gait: Gait normal.   Psychiatric:         Mood and Affect: Mood normal.         Thought Content: Thought content normal.         Judgment: Judgment normal.         Labs    CBC:  Recent Labs     11/20/23 1952 11/21/23 0137 11/21/23  0650   WBC 6.3  --  6.9   RBC 2.21*  --  2.43*   HGB 6.8* 7.0* 7.4*   HCT 21.2* 22.0* 22.7*   MCV 95.9  --  93.6   RDW 21.0*  --  20.4*     --  229     CHEMISTRIES:  Recent Labs     11/20/23 1952 11/21/23  0650   * 137   K 3.6 3.4*   CL 95* 101   CO2 27 26   BUN 16 11   CREATININE 0.6* <0.5*   GLUCOSE 107* 91   MG  --  1.70*     PT/INR:No results for input(s): \"PROTIME\", \"INR\" in the last 72 hours. APTT:No results for input(s): \"APTT\" in the last 72 hours. LIVER PROFILE:  Recent Labs     11/21/23  0650   AST 19   ALT 14   BILITOT 0.6   ALKPHOS 110       Imaging/Diagnostics   CT CHEST W CONTRAST    Result Date: 11/20/2023  1. Proximal esophagus is diffusely abnormal in appearance. no contrast extravasation. 2. Complex pneumonia in the right upper lobe. There are lung abscesses and possibly an empyema. However, this is new since the PET-CT fusion of 08/17/2023 3. Concern for right perihilar mass.   The pneumonia may be
lungs bilaterally. Though this may be infectious, close  follow-up is recommended    [place] Esophagram: 11/21/23   FINDINGS:  Port is seen in the left chest wall. Consolidation is seen within the right  perihilar region, compatible with that seen on recent CT. Water-soluble  contrast was given to patient by mouth. Patient experienced coughing episode immediately and no significant contrast was seen in the esophagus. A 2nd attempt of swallowing was made, with minimal contrast in the esophagus. Coughing episode again occurred. Lateral view is obtain which did  demonstrate hyperdensity along the anterior aspect of the trachea, suggestive of aspiration. The exam was subsequently terminated. IMPRESSION:  Findings are suggestive of aspiration. The exam was subsequently terminated. Evaluation of the esophagus is limited as a consequence. ASSESSMENT AND PLAN:    Pt is a very pleasant 46 y.o. male sent for further evaluation of suspected esophageal perforation. Upon attempting the esophagram he was aspirating and the exam was terminated. Review of the CT imaging with Dr. Shahriar Bailey did not show evidence of extravasation. His WBC also remains within normal limits (6.9). He does not need surgical intervention. We will sign off. Thank you for the consult.        Aamir Montes, LEONARDO - DANIEL  11/21/2023  12:04 PM

## 2023-11-27 NOTE — CARE COORDINATION
Chart reviewed day 7. Care managed by CT surgery, pulm, GI, hem onc, and IM. Surgery consult pending. J tube not functioning may need replaced. TF provided by [employer]. Continues with IVATBX. Ambulatory in room IPTA, following. Pepe Wright RN      D/c order noted. Dr Ina Chavez with recs for po atbx thru J tube. Tube if functioning now. Has TF arranged with [employer]. No DCP needs. Fmaily here to take home.  Pepe Wright RN

## 2023-11-27 NOTE — PLAN OF CARE
Problem: Pain  Goal: Verbalizes/displays adequate comfort level or baseline comfort level  11/21/2023 1202 by Adali Moore RN  Outcome: Progressing  Flowsheets (Taken 11/21/2023 1202)  Verbalizes/displays adequate comfort level or baseline comfort level:   Encourage patient to monitor pain and request assistance   Administer analgesics based on type and severity of pain and evaluate response   Implement non-pharmacological measures as appropriate and evaluate response   Assess pain using appropriate pain scale     Problem: Skin/Tissue Integrity  Goal: Absence of new skin breakdown  Description: 1. Monitor for areas of redness and/or skin breakdown  2. Assess vascular access sites hourly  3. Every 4-6 hours minimum:  Change oxygen saturation probe site  4. Every 4-6 hours:  If on nasal continuous positive airway pressure, respiratory therapy assess nares and determine need for appliance change or resting period.   11/21/2023 1202 by Adali Moore RN  Outcome: Progressing    Problem: Gastrointestinal - Adult  Goal: Maintains adequate nutritional intake  Outcome: Progressing  Flowsheets (Taken 11/21/2023 1202)  Maintains adequate nutritional intake:   Monitor intake and output, weight and lab values   Obtain nutritional consult as needed   Identify factors contributing to decreased intake, treat as appropriate   Assist with meals as needed     Problem: Infection - Adult  Goal: Absence of infection at discharge  Outcome: Progressing  Flowsheets (Taken 11/21/2023 1202)  Absence of infection at discharge:   Monitor all insertion sites i.e., indwelling lines, tubes and drains   Assess and monitor for signs and symptoms of infection   Monitor lab/diagnostic results   Administer medications as ordered     Problem: Hematologic - Adult  Goal: Maintains hematologic stability  Outcome: Progressing  Flowsheets (Taken 11/21/2023 1202)  Maintains hematologic stability:   Assess for signs and symptoms of
Problem: Pain  Goal: Verbalizes/displays adequate comfort level or baseline comfort level  11/24/2023 0922 by Julia Olivas RN  Outcome: Progressing  11/23/2023 2350 by Ruddy Almanza RN  Outcome: Progressing  Flowsheets (Taken 11/23/2023 1605 by Heron Fajardo RN)  Verbalizes/displays adequate comfort level or baseline comfort level:   Encourage patient to monitor pain and request assistance   Assess pain using appropriate pain scale   Administer analgesics based on type and severity of pain and evaluate response   Implement non-pharmacological measures as appropriate and evaluate response     Problem: Skin/Tissue Integrity  Goal: Absence of new skin breakdown  Description: 1. Monitor for areas of redness and/or skin breakdown  2. Assess vascular access sites hourly  3. Every 4-6 hours minimum:  Change oxygen saturation probe site  4. Every 4-6 hours:  If on nasal continuous positive airway pressure, respiratory therapy assess nares and determine need for appliance change or resting period.   11/24/2023 0922 by Julia Olivas RN  Outcome: Progressing  11/23/2023 2350 by Ruddy Almanza RN  Outcome: Progressing     Problem: Gastrointestinal - Adult  Goal: Maintains adequate nutritional intake  11/24/2023 0922 by Julia Olivas RN  Outcome: Progressing  11/23/2023 2350 by Ruddy Almanza RN  Outcome: Progressing  Flowsheets (Taken 11/21/2023 1202 by Harinder Mcnair RN)  Maintains adequate nutritional intake:   Monitor intake and output, weight and lab values   Obtain nutritional consult as needed   Identify factors contributing to decreased intake, treat as appropriate   Assist with meals as needed     Problem: Infection - Adult  Goal: Absence of infection at discharge  11/23/2023 2350 by Ruddy Almanza RN  Outcome: Progressing
Problem: Pain  Goal: Verbalizes/displays adequate comfort level or baseline comfort level  11/27/2023 1530 by Yakov Mcgee RN  Outcome: Completed     Problem: Skin/Tissue Integrity  Goal: Absence of new skin breakdown  Description: 1. Monitor for areas of redness and/or skin breakdown  2. Assess vascular access sites hourly  3. Every 4-6 hours minimum:  Change oxygen saturation probe site  4. Every 4-6 hours:  If on nasal continuous positive airway pressure, respiratory therapy assess nares and determine need for appliance change or resting period.   Outcome: Completed     Problem: Gastrointestinal - Adult  Goal: Maintains adequate nutritional intake  Outcome: Completed     Problem: Infection - Adult  Goal: Absence of infection at discharge  Outcome: Completed     Problem: Hematologic - Adult  Goal: Maintains hematologic stability  Outcome: Completed     Problem: Nutrition Deficit:  Goal: Optimize nutritional status  Outcome: Completed
Problem: Pain  Goal: Verbalizes/displays adequate comfort level or baseline comfort level  Outcome: Progressing     Problem: Skin/Tissue Integrity  Goal: Absence of new skin breakdown  Description: 1. Monitor for areas of redness and/or skin breakdown  2. Assess vascular access sites hourly  3. Every 4-6 hours minimum:  Change oxygen saturation probe site  4. Every 4-6 hours:  If on nasal continuous positive airway pressure, respiratory therapy assess nares and determine need for appliance change or resting period.   Outcome: Progressing
Problem: Pain  Goal: Verbalizes/displays adequate comfort level or baseline comfort level  Outcome: Progressing     Problem: Skin/Tissue Integrity  Goal: Absence of new skin breakdown  Description: 1. Monitor for areas of redness and/or skin breakdown  2. Assess vascular access sites hourly  3. Every 4-6 hours minimum:  Change oxygen saturation probe site  4. Every 4-6 hours:  If on nasal continuous positive airway pressure, respiratory therapy assess nares and determine need for appliance change or resting period.   Outcome: Progressing     Problem: Gastrointestinal - Adult  Goal: Maintains adequate nutritional intake  Outcome: Progressing     Problem: Infection - Adult  Goal: Absence of infection at discharge  Outcome: Progressing     Problem: Hematologic - Adult  Goal: Maintains hematologic stability  Outcome: Progressing     Problem: Nutrition Deficit:  Goal: Optimize nutritional status  Outcome: Progressing
Problem: Pain  Goal: Verbalizes/displays adequate comfort level or baseline comfort level  Outcome: Progressing     Problem: Skin/Tissue Integrity  Goal: Absence of new skin breakdown  Description: 1. Monitor for areas of redness and/or skin breakdown  2. Assess vascular access sites hourly  3. Every 4-6 hours minimum:  Change oxygen saturation probe site  4. Every 4-6 hours:  If on nasal continuous positive airway pressure, respiratory therapy assess nares and determine need for appliance change or resting period.   Outcome: Progressing     Problem: Gastrointestinal - Adult  Goal: Maintains adequate nutritional intake  Outcome: Progressing     Problem: Infection - Adult  Goal: Absence of infection at discharge  Outcome: Progressing     Problem: Hematologic - Adult  Goal: Maintains hematologic stability  Outcome: Progressing     Problem: Nutrition Deficit:  Goal: Optimize nutritional status  Outcome: Progressing
Problem: Pain  Goal: Verbalizes/displays adequate comfort level or baseline comfort level  Outcome: Progressing  Flowsheets (Taken 11/23/2023 1605)  Verbalizes/displays adequate comfort level or baseline comfort level:   Encourage patient to monitor pain and request assistance   Assess pain using appropriate pain scale   Administer analgesics based on type and severity of pain and evaluate response   Implement non-pharmacological measures as appropriate and evaluate response     Problem: Nutrition Deficit:  Goal: Optimize nutritional status  Outcome: Progressing  Flowsheets (Taken 11/23/2023 1605)  Nutrient intake appropriate for improving, restoring, or maintaining nutritional needs:   Monitor oral intake, labs, and treatment plans   Recommend appropriate diets, oral nutritional supplements, and vitamin/mineral supplements   Assess nutritional status and recommend course of action
Problem: Pain  Goal: Verbalizes/displays adequate comfort level or baseline comfort level  Outcome: Progressing  Flowsheets (Taken 11/27/2023 5435)  Verbalizes/displays adequate comfort level or baseline comfort level:   Encourage patient to monitor pain and request assistance   Assess pain using appropriate pain scale   Administer analgesics based on type and severity of pain and evaluate response   Implement non-pharmacological measures as appropriate and evaluate response
Progressing  Flowsheets (Taken 11/23/2023 1605 by Terrance Orosco RN)  Nutrient intake appropriate for improving, restoring, or maintaining nutritional needs:   Monitor oral intake, labs, and treatment plans   Recommend appropriate diets, oral nutritional supplements, and vitamin/mineral supplements   Assess nutritional status and recommend course of action

## 2023-11-27 NOTE — DISCHARGE SUMMARY
V2.0  Discharge Summary    Name:  Luis Olivares /Age/Sex: 1975 (46 y.o. male)   Admit Date: 2023  Discharge Date: 23    MRN & CSN:  [id number] & [id number] Encounter Date and Time 23 3:05 PM EST    Attending:  Chaka Eubanks MD Discharging Provider: Keri Dick DO       Hospital Course:     Brief HPI: Luis Olivares is a 46 y.o. male who presented with abnormal PET scan    Brief Problem Based Course:   Sepsis  Patient arrived positive for SIRS with tachycardia and tachypnea with source confusion lung. Lactic acid was negative and x2 blood cultures were drawn. CT chest with contrast was performed and showed complex right upper lobe possible abscess or empyema. Patient was started on Zosyn and vancomycin. Pulmonology, CT surgery, infectious disease was following. Patient was afebrile with no elevated white blood cell count. Patient was treated for pneumonia. Resolved. Blood cultures no growth to date. Patient will be discharged on metronidazole and Levaquin through the J-tube for 7 days and will follow-up outpatient with infectious disease  Pneumonia  Patient was initially on vancomycin and cefepime and was switched to PIP Tazo and fluconazole by infectious disease. Patient's sputum culture grew Klebsiella and this regimen covers for Klebsiella. Patient white blood cells remain nonelevated, and patient had no fevers. Patient will be discharged on metronidazole and Levaquin through the J-tube for 7 days and will follow-up outpatient with infectious disease. Esophageal perforation with possible extrapulmonary abscess  Patient had a PET scan for esophageal cancer monitoring and showed possible esophageal rupture. Repeat CT showed complex right upper lobe possible abscess or empyema. Patient had a EGD performed by GI and showed esophageal perforation and malignant mass near the area. GI put a metal stent.   CT surgery and pulmonology was discussing possible chest tube

## 2023-11-28 ENCOUNTER — APPOINTMENT (OUTPATIENT)
Dept: CT IMAGING | Age: 48
End: 2023-11-28
Payer: COMMERCIAL

## 2023-11-28 ENCOUNTER — HOSPITAL ENCOUNTER (INPATIENT)
Age: 48
LOS: 1 days | Discharge: HOME | End: 2023-11-29
Attending: EMERGENCY MEDICINE | Admitting: STUDENT IN AN ORGANIZED HEALTH CARE EDUCATION/TRAINING PROGRAM
Payer: COMMERCIAL

## 2023-11-28 ENCOUNTER — TELEPHONE (OUTPATIENT)
Dept: SURGERY | Age: 48
End: 2023-11-28

## 2023-11-28 ENCOUNTER — APPOINTMENT (OUTPATIENT)
Dept: GENERAL RADIOLOGY | Age: 48
End: 2023-11-28
Payer: COMMERCIAL

## 2023-11-28 DIAGNOSIS — C15.9 MALIGNANT NEOPLASM OF ESOPHAGUS, UNSPECIFIED LOCATION (HCC): ICD-10-CM

## 2023-11-28 DIAGNOSIS — R04.2 HEMOPTYSIS: Primary | ICD-10-CM

## 2023-11-28 LAB
ALBUMIN SERPL-MCNC: 2.4 G/DL (ref 3.4–5)
ALBUMIN/GLOB SERPL: 0.4 {RATIO} (ref 1.1–2.2)
ALP SERPL-CCNC: 109 U/L (ref 40–129)
ALT SERPL-CCNC: 12 U/L (ref 10–40)
ANION GAP SERPL CALCULATED.3IONS-SCNC: 14 MMOL/L (ref 3–16)
AST SERPL-CCNC: 21 U/L (ref 15–37)
BASOPHILS # BLD: 0.1 K/UL (ref 0–0.2)
BASOPHILS NFR BLD: 1 %
BILIRUB SERPL-MCNC: 0.5 MG/DL (ref 0–1)
BUN SERPL-MCNC: 8 MG/DL (ref 7–20)
CALCIUM SERPL-MCNC: 10.2 MG/DL (ref 8.3–10.6)
CHLORIDE SERPL-SCNC: 95 MMOL/L (ref 99–110)
CO2 SERPL-SCNC: 26 MMOL/L (ref 21–32)
CREAT SERPL-MCNC: 0.6 MG/DL (ref 0.9–1.3)
DEPRECATED RDW RBC AUTO: 20.4 % (ref 12.4–15.4)
EKG ATRIAL RATE: 113 BPM
EKG DIAGNOSIS: NORMAL
EKG P AXIS: 63 DEGREES
EKG P-R INTERVAL: 136 MS
EKG Q-T INTERVAL: 302 MS
EKG QRS DURATION: 80 MS
EKG QTC CALCULATION (BAZETT): 414 MS
EKG R AXIS: 67 DEGREES
EKG T AXIS: 68 DEGREES
EKG VENTRICULAR RATE: 113 BPM
EOSINOPHIL # BLD: 0.1 K/UL (ref 0–0.6)
EOSINOPHIL NFR BLD: 0.6 %
GFR SERPLBLD CREATININE-BSD FMLA CKD-EPI: >60 ML/MIN/{1.73_M2}
GLUCOSE SERPL-MCNC: 112 MG/DL (ref 70–99)
HCT VFR BLD AUTO: 26.9 % (ref 40.5–52.5)
HGB BLD-MCNC: 8.5 G/DL (ref 13.5–17.5)
LACTATE BLDV-SCNC: 0.8 MMOL/L (ref 0.4–1.9)
LACTATE BLDV-SCNC: 0.9 MMOL/L (ref 0.4–1.9)
LYMPHOCYTES # BLD: 0.9 K/UL (ref 1–5.1)
LYMPHOCYTES NFR BLD: 7.4 %
MCH RBC QN AUTO: 29.6 PG (ref 26–34)
MCHC RBC AUTO-ENTMCNC: 31.5 G/DL (ref 31–36)
MCV RBC AUTO: 94.1 FL (ref 80–100)
MONOCYTES # BLD: 0.8 K/UL (ref 0–1.3)
MONOCYTES NFR BLD: 6.8 %
NEUTROPHILS # BLD: 9.8 K/UL (ref 1.7–7.7)
NEUTROPHILS NFR BLD: 84.2 %
PLATELET # BLD AUTO: 371 K/UL (ref 135–450)
PMV BLD AUTO: 8 FL (ref 5–10.5)
POTASSIUM SERPL-SCNC: 3.6 MMOL/L (ref 3.5–5.1)
PROT SERPL-MCNC: 8.4 G/DL (ref 6.4–8.2)
RBC # BLD AUTO: 2.86 M/UL (ref 4.2–5.9)
SODIUM SERPL-SCNC: 135 MMOL/L (ref 136–145)
SPECIMEN STATUS: NORMAL
WBC # BLD AUTO: 11.6 K/UL (ref 4–11)

## 2023-11-28 PROCEDURE — 93010 ELECTROCARDIOGRAM REPORT: CPT | Performed by: INTERNAL MEDICINE

## 2023-11-28 PROCEDURE — 36415 COLL VENOUS BLD VENIPUNCTURE: CPT

## 2023-11-28 PROCEDURE — 83605 ASSAY OF LACTIC ACID: CPT

## 2023-11-28 PROCEDURE — 93005 ELECTROCARDIOGRAM TRACING: CPT | Performed by: PHYSICIAN ASSISTANT

## 2023-11-28 PROCEDURE — 96360 HYDRATION IV INFUSION INIT: CPT

## 2023-11-28 PROCEDURE — 80053 COMPREHEN METABOLIC PANEL: CPT

## 2023-11-28 PROCEDURE — 2580000003 HC RX 258: Performed by: STUDENT IN AN ORGANIZED HEALTH CARE EDUCATION/TRAINING PROGRAM

## 2023-11-28 PROCEDURE — 2580000003 HC RX 258: Performed by: PHYSICIAN ASSISTANT

## 2023-11-28 PROCEDURE — 85025 COMPLETE CBC W/AUTO DIFF WBC: CPT

## 2023-11-28 PROCEDURE — 6360000002 HC RX W HCPCS: Performed by: STUDENT IN AN ORGANIZED HEALTH CARE EDUCATION/TRAINING PROGRAM

## 2023-11-28 PROCEDURE — 1200000000 HC SEMI PRIVATE

## 2023-11-28 PROCEDURE — 87040 BLOOD CULTURE FOR BACTERIA: CPT

## 2023-11-28 PROCEDURE — 71250 CT THORAX DX C-: CPT

## 2023-11-28 PROCEDURE — 99285 EMERGENCY DEPT VISIT HI MDM: CPT

## 2023-11-28 PROCEDURE — 71046 X-RAY EXAM CHEST 2 VIEWS: CPT

## 2023-11-28 RX ORDER — ACETAMINOPHEN 650 MG/1
650 SUPPOSITORY RECTAL EVERY 6 HOURS PRN
Status: DISCONTINUED | OUTPATIENT
Start: 2023-11-28 | End: 2023-11-29 | Stop reason: HOSPADM

## 2023-11-28 RX ORDER — ACETAMINOPHEN 325 MG/1
650 TABLET ORAL EVERY 6 HOURS PRN
Status: DISCONTINUED | OUTPATIENT
Start: 2023-11-28 | End: 2023-11-29 | Stop reason: HOSPADM

## 2023-11-28 RX ORDER — LEVOFLOXACIN 5 MG/ML
750 INJECTION, SOLUTION INTRAVENOUS EVERY 24 HOURS
Status: DISCONTINUED | OUTPATIENT
Start: 2023-11-28 | End: 2023-11-29 | Stop reason: HOSPADM

## 2023-11-28 RX ORDER — ONDANSETRON 2 MG/ML
4 INJECTION INTRAMUSCULAR; INTRAVENOUS EVERY 6 HOURS PRN
Status: DISCONTINUED | OUTPATIENT
Start: 2023-11-28 | End: 2023-11-29 | Stop reason: HOSPADM

## 2023-11-28 RX ORDER — SODIUM CHLORIDE 0.9 % (FLUSH) 0.9 %
5-40 SYRINGE (ML) INJECTION EVERY 12 HOURS SCHEDULED
Status: DISCONTINUED | OUTPATIENT
Start: 2023-11-28 | End: 2023-11-29 | Stop reason: HOSPADM

## 2023-11-28 RX ORDER — MAGNESIUM SULFATE IN WATER 40 MG/ML
2000 INJECTION, SOLUTION INTRAVENOUS PRN
Status: DISCONTINUED | OUTPATIENT
Start: 2023-11-28 | End: 2023-11-29 | Stop reason: HOSPADM

## 2023-11-28 RX ORDER — POTASSIUM CHLORIDE 20 MEQ/1
40 TABLET, EXTENDED RELEASE ORAL PRN
Status: DISCONTINUED | OUTPATIENT
Start: 2023-11-28 | End: 2023-11-29 | Stop reason: HOSPADM

## 2023-11-28 RX ORDER — SODIUM CHLORIDE 0.9 % (FLUSH) 0.9 %
5-40 SYRINGE (ML) INJECTION PRN
Status: DISCONTINUED | OUTPATIENT
Start: 2023-11-28 | End: 2023-11-29 | Stop reason: HOSPADM

## 2023-11-28 RX ORDER — POTASSIUM CHLORIDE 7.45 MG/ML
10 INJECTION INTRAVENOUS PRN
Status: DISCONTINUED | OUTPATIENT
Start: 2023-11-28 | End: 2023-11-29 | Stop reason: HOSPADM

## 2023-11-28 RX ORDER — ONDANSETRON 4 MG/1
4 TABLET, ORALLY DISINTEGRATING ORAL EVERY 8 HOURS PRN
Status: DISCONTINUED | OUTPATIENT
Start: 2023-11-28 | End: 2023-11-29 | Stop reason: HOSPADM

## 2023-11-28 RX ORDER — 0.9 % SODIUM CHLORIDE 0.9 %
1000 INTRAVENOUS SOLUTION INTRAVENOUS ONCE
Status: COMPLETED | OUTPATIENT
Start: 2023-11-28 | End: 2023-11-28

## 2023-11-28 RX ORDER — SODIUM CHLORIDE 9 MG/ML
INJECTION, SOLUTION INTRAVENOUS PRN
Status: DISCONTINUED | OUTPATIENT
Start: 2023-11-28 | End: 2023-11-29 | Stop reason: HOSPADM

## 2023-11-28 RX ORDER — POLYETHYLENE GLYCOL 3350 17 G/17G
17 POWDER, FOR SOLUTION ORAL DAILY PRN
Status: DISCONTINUED | OUTPATIENT
Start: 2023-11-28 | End: 2023-11-29 | Stop reason: HOSPADM

## 2023-11-28 RX ORDER — SODIUM CHLORIDE, SODIUM LACTATE, POTASSIUM CHLORIDE, CALCIUM CHLORIDE 600; 310; 30; 20 MG/100ML; MG/100ML; MG/100ML; MG/100ML
INJECTION, SOLUTION INTRAVENOUS CONTINUOUS
Status: DISCONTINUED | OUTPATIENT
Start: 2023-11-28 | End: 2023-11-29 | Stop reason: HOSPADM

## 2023-11-28 RX ADMIN — SODIUM CHLORIDE, POTASSIUM CHLORIDE, SODIUM LACTATE AND CALCIUM CHLORIDE: 600; 310; 30; 20 INJECTION, SOLUTION INTRAVENOUS at 17:53

## 2023-11-28 RX ADMIN — SODIUM CHLORIDE 1000 ML: 9 INJECTION, SOLUTION INTRAVENOUS at 12:10

## 2023-11-28 RX ADMIN — LEVOFLOXACIN 750 MG: 5 INJECTION, SOLUTION INTRAVENOUS at 17:55

## 2023-11-28 RX ADMIN — Medication 10 ML: at 20:42

## 2023-11-28 ASSESSMENT — PAIN SCALES - GENERAL
PAINLEVEL_OUTOF10: 0

## 2023-11-28 ASSESSMENT — PAIN - FUNCTIONAL ASSESSMENT
PAIN_FUNCTIONAL_ASSESSMENT: 0-10
PAIN_FUNCTIONAL_ASSESSMENT: 0-10

## 2023-11-28 NOTE — ED NOTES
Attempted to call report to the floor x2.       Barak De Jesus RN  11/28/23 3752
Cristina Crane NP with GI at bedside.      Edmundo Diaz RN  11/28/23 9086
Dr. Ashlee García at [facility], [address] [place] [address]  11/28/23 0139
ED GI CONSULT  RE-hemoptysis  1153-paged Elysia Alexander through [address] returned page- transferred to [facility]      Deni Stinson  11/28/23 121
M/S 326 @ 1637
Power port verified by surgical note from Dr. Arelis Najera, May 2023 and chest x-ray from November 21, 2023.      Loyal Olszewski, RN  11/28/23 1113
Report given to [address] [place] [address].      Joanna Miramontes RN  11/28/23 0682
Report given to [facility].       Yann Oliver RN  11/28/23 1611
Thyroid is unremarkable. Osseous: No suspicious lytic or sclerotic lesions. 1. Proximal esophagus is diffusely abnormal in appearance. no contrast extravasation. 2. Complex pneumonia in the right upper lobe. There are lung abscesses and possibly an empyema. However, this is new since the PET-CT fusion of 08/17/2023 3. Concern for right perihilar mass. The pneumonia may be postobstructive pneumonia. 4. Diffuse consolidation in the right perihilar region. Innumerable tiny nodules in the lungs bilaterally. Though this may be infectious, close follow-up is recommended     XR CHEST PORTABLE    Result Date: 11/20/2023  EXAMINATION: ONE XRAY VIEW OF THE CHEST 11/20/2023 6:54 pm COMPARISON: 03/22/2023 HISTORY: ORDERING SYSTEM PROVIDED HISTORY: abnormal PET scan TECHNOLOGIST PROVIDED HISTORY: Reason for exam:->abnormal PET scan Reason for Exam: abnormal PET scan, power port verification FINDINGS: Interval left chest wall Port-A-Cath placement with the tip projecting to the SVC. The cardiac silhouette is normal.  The mediastinal contour is enlarged, new compared to prior exam, concerning for lymphadenopathy. There is new masslike consolidative opacity in the right upper lung. Atelectatic changes are seen in the right lung base with tenting of the right hemidiaphragm. No gross pleural effusion. No pneumothorax. Surgical clips in the right upper chest.     1.  Enlarged mediastinal silhouette, concerning for lymphadenopathy. 2.  Masslike consolidation in the right upper lung, consider further evaluation with CT. 3.  Opacity in the right lung base with tenting of the right hemidiaphragm probably represents atelectasis. XR ABDOMEN (KUB) (SINGLE AP VIEW)    Result Date: 11/7/2023  EXAMINATION: ONE SUPINE XRAY VIEW(S) OF THE ABDOMEN 11/7/2023 4:07 pm COMPARISON: None.  HISTORY: ORDERING SYSTEM PROVIDED HISTORY: confirmation jejunal tube TECHNOLOGIST PROVIDED HISTORY: Reason for exam:->confirmation jejunal tube

## 2023-11-28 NOTE — CONSULTS
Oncology Hematology Care    Consult Note      Requesting Physician:  Jeffrey Barnett CNP    CHIEF COMPLAINT:  esophageal cancer       HISTORY OF PRESENT ILLNESS:    Yosi Brown is a 46year old male with PMHX non hodgkins lymphoma, hodgkins lymphoma, oropharyngeal squamous cell carcinoma, and current esophageal cancer on treatment at [facility] who presented to [facility] ED on 11/20 as instructed by IR after PET scan showed possible tear in the esophagus. Found to have esophageal tear s/p stenting. Subsequent abx treatment received for extrapulmonary abscess/aspiration pneumonia. Discharged 11/27. Patient returned to ED 11/28 due to hemoptysis. Mostly dark red and states he has been coughing it up. Phlegm and frothy sputum. Hem onc consulted for evaluation of new onset hemoptysis and continuity of care. History as outlined in assessment and plan. Mr. Theresa Londono  is a 46 y.o. male we are seeing in consultation for No diagnosis found.        Past Medical History:  Past Medical History:   Diagnosis Date    Cancer ([address])     lymphoma    Dysphagia     Esophageal cancer ([address]) 04/26/2023    squamous cell carcinoma    Hypertension     Neoplasm, soft palate     Weight loss        Past Surgical History:  Past Surgical History:   Procedure Laterality Date    BRONCHOSCOPY N/A 11/24/2023    BRONCHOSCOPY DIAGNOSTIC OR CELL [address] ONLY performed by Elan Toscano MD at [facility]  11/24/2023    BRONCHOSCOPY ALVEOLAR LAVAGE performed by Elan Toscano MD at [facility]  11/24/2023    BRONCHOSCOPY THERAPUTIC ASPIRATION INITIAL performed by Elan Toscano MD at [address] [place] [address]      @ 10years old, led to Non-Hodgkin's diagnosis    ESOPHAGOGASTRODUODENOSCOPY      NECK LESION BIOPSY      PORT SURGERY N/A 5/3/2023    SURGICAL PORT PLACEMENT performed by Lyudmila Johnson B12 monthly with the last dose 11/7/2023  - hgb stable at 8.5 g/dL this AM      Hodgkin's disease:  -1998  -Status post MOPP/ABVD  -No evidence of recurrence     Non-Hodgkin's lymphoma:  -9/22/22009  -Treated with Rituxan/ice  -Radiation could not be used due to previous Hodgkin's disease  -It was not feasible to use Adriamycin due to his previous Adriamycin doses     Carcinoma of the soft palate:  -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        ROSA Vazquez  Please Contact Through [employer]

## 2023-11-28 NOTE — PROGRESS NOTES
4 Eyes Skin Assessment     The patient is being assess for  Admission    I agree that 2 RN's have performed a thorough Head to Toe Skin Assessment on the patient. ALL assessment sites listed below have been assessed. Areas assessed by both nurses: Haider Covarrubias RN  [x]   Head, Face, and Ears   [x]   Shoulders, Back, and Chest  [x]   Arms, Elbows, and Hands   [x]   Coccyx, Sacrum, and Ischum  [x]   Legs, Feet, and Heels        Does the Patient have Skin Breakdown?   No         Jone Prevention initiated:  Yes   Wound Care Orders initiated:  No      [facility] nurse consulted for Pressure Injury (Stage 3,4, Unstageable, DTI, NWPT, and Complex wounds):  No      Nurse 1 eSignature: Electronically signed by Amado Enriquez RN on 11/28/23 at 5:41 PM EST    **SHARE this note so that the co-signing nurse is able to place an eSignature**    Nurse 2 eSignature: Electronically signed by Storm Adams RN on 11/28/23 at 8:24 PM EST Problem: Patient Care Overview  Goal: Plan of Care Review  POC reviewed with pt at 0452.  No acute events overnight. Pt progressing toward goals. Will continue to monitor. See flowsheets for full assessment and VS info

## 2023-11-28 NOTE — ED PROVIDER NOTES
Emergency Department Attending Provider Note  Location: [facility]  ED  11/28/2023   Note Started: 1:18 PM EST 11/28/23       Patient Identification  Sunitha Ding is a 46 y.o. male      HPI:Jostin Randhawa Man was evaluated in the Emergency Department for reported hemoptysis. Patient has history of esophageal perforation/aspiration pneumonia requiring recent admission. He presents to the emergency department today stating that he has been \"coughing up blood. \"  Blood is mostly dark red in nature. Patient denies fevers, chills, or sweats. No chest pain or abdominal pain reported. . Although initial history and physical exam information was obtained by NHI/NPP/MD/DO (who also dictated a record of this visit), I personally saw the patient and performed a substantive portion of the visit including all aspects of the medical decision making. Patient currently on Levaquin. PHYSICAL EXAM:  General: No acute distress. No significant discomfort. Head: Normal cephalic/atraumatic. HEENT: EOMI. Oropharynx within normal limits. Heart: Tachycardic. Normal S1-S2. No rubs, gallops, or murmurs. Lungs: Clear to auscultation. No wheezes, rales, rhonchi. Abdomen: Soft. Extremities: No swelling or edema. EKG Interpretation  Sinus tachycardia with a rate of 113. Normal axis. Normal intervals and durations. Nonspecific T wave changes noted. These appear to be new when compared to previous EKG on November 20, 2023. Sinus tachycardia is unchanged. Patient seen and evaluated. Relevant records reviewed. MDM:  Patient presents to the emergency department with hemoptysis. Patient has known history of recent aspiration pneumonia as well as esophageal perforation. He does not take any antiplatelet or anticoagulant medications and is currently on Levaquin. He arrives into the emergency department tachycardic. CLINICAL IMPRESSION  1. Hemoptysis    2.  Malignant neoplasm of esophagus, unspecified location [facility])          Dedrick TOVAR DO, am the primary clinician of record. I personally saw the patient and independently provided 32 minutes of non-concurrent critical care out of the total shared critical care time provided. This chart was generated in part by using Dragon Dictation system and may contain errors related to that system including errors in grammar, punctuation, and spelling, as well as words and phrases that may be inappropriate. If there are any questions or concerns please feel free to contact the dictating provider for clarification.      Syed Bernard, DO  [employer]        Michael Press II, DO  11/28/23 1602

## 2023-11-28 NOTE — H&P
V2.0  History and Physical      Name:  Gregory Sanford /Age/Sex: 1975  (46 y.o. male)   MRN & CSN:  [id number] & [id number] Encounter Date/Time: 2023 4:43 PM EST   Location:   PCP: Jaime Ibarra [address] [place] [address] Day: 1    Assessment and Plan:   Gregory Sanford is a 46 y.o. male with a pmh of esophageal cancer metastatic to the lungs suspected with recent bronchoscopy who presents with hemoptysis    Hospital Problems             Last Modified POA    * (Principal) Esophageal cancer ([address]) 2023 Yes       Hemoptysis in the setting of underlying worsening metastatic resulting stage IV esophageal cancer: Patient was receiving chemotherapy cycle #4. Repeat CT showed dominant cavitary focus on right upper lobe. This was presumed to be a cavitary pneumonia but could also has not cavitary metastasis. Heme and oncology was following and will follow-up outpatient . Started on levaquin. Discussed with family no intervention plans. Discussed with oncology and GI, CODE STATUS changed to LIMITED code and palliative consult in place for goals of care. Patient considering hospice. H/o esophageal perforations/p esophageal stent. GI on board. Previous h/o consideration of chest tube but not placed due to high risk of perforation. Oropharyngeal dysphagia: has feeding J tube nutrition consulted advance tube feed hydration. Benign Essential HTN:   Anemia of chronic disease    Comment: Please note this report has been produced using speech recognition software and may contain errors related to that system including errors in grammar, punctuation, and spelling, as well as words and phrases that may be inappropriate. If there are any questions or concerns please feel free to contact the dictating provider for clarification.      Disposition:   Current Living situation: home  Expected Disposition: likely hospice  Estimated D/C: 3-4 days    Diet No diet orders on file   DVT Prophylaxis [] esophageal wall thickening seen superiorly and inferiorly to the stent. Small pericardial effusion is seen. Pericardial calcification is seen superiorly. Tip of MediPort seen in SVC. Small mediastinal and hilar nodes are noted. , similar to prior. Soft tissue thickening in right hilar region is seen, similar to prior, likely lymph nodes Tiny focus of pneumomediastinum is seen in the right paratracheal region. Lungs/pleura: On the left, subtle ground-glass opacities in the left upper lobe are unchanged. Focal irregularity nodule superior segment in the left lower lobe and posteriorly in the left lower lobe are unchanged. On the right, there is persistent right-sided pleural effusions seen. There is increased consolidative change in the right lower lobe. Scattered tree-in-bud nodularity in the right lower lobe is seen, similar to prior with more focal confluent opacity posteriorly. Scattered tree-in-bud nodularity seen in the right middle lobe, similar to prior. Consolidative change is seen in the right upper lobe, with parenchymal opacity, ground-glass opacity, air bronchograms, and a focal cavitary focus with an air-fluid level measures up to 5.1 cm obliquely by 3.9 cm medial-lateral.  There is increased fluid and decreased gas internally on today study. Upper Abdomen: Adrenal glands unremarkable. Soft Tissues/Bones: Sclerotic focus is seen in the posterior aspect of T3 vertebral body. Dominant cavitary focus in the right upper lobe. There is increased fluid and decreased gas internally. This presumably represents cavitary pneumonia but could represent cavitary metastasis given the reported history of esophageal carcinoma. Scattered nodularity throughout the lungs bilaterally, with more focal areas of nodularity also noted, most pronounced in the left upper and left lower lobe. These more focal areas of nodularity could be postinflammatory-infectious or neoplastic.   Recommend follow-up imaging as per

## 2023-11-28 NOTE — TELEPHONE ENCOUNTER
Megan Pizarro from [address] called regarding pt and getting a verbal ok to continue feeding tube orders. Please give Megan Pizarro a call, thank you.

## 2023-11-28 NOTE — CONSULTS
Nell Garcia is a 46 y.o. male asked to see us in consultation by  John Jorge, [address] [place] [address] for evaluation of hemoptysis. Mr. Adali Sheikh is a 46year old male with past medical history of non hodgkins lymphoma, hodgkins lymphoma, oropharyngeal squamous cell carcinoma, and current esophageal cancer with mets to the lung who was sent into the ER last week, 11/21, after outpatient PET scan incidentally showed evidence of an esophageal perforation following EGD with esophageal dilation of an upper esophageal stricture on 11/15. Barium esophagram was aborted as he was aspirating, but upper endoscopy showed evidence of a perforation at 28-30 cm, as well as known malignant appearing esophageal mass. An esophageal stent was placed. CT chest also showed evidence of pneumonia Rt lobe lung abscess. A chest tube was considered but deferred due to concern for potential development of a bronchopleural fistula formation with percutaneous approach. He was discharged yesterday, but presents back to the ER now with hemoptysis/ spitting up blood that started around midnight last night. He denies hematemesis, melena, abdominal pain and shortness of breath. He is tachycardic but BP stable. His Hgb is 8.5 which is up from yesterday morning. A CT chest shows no evidence of esophageal perforation but overall worsening bronchopneumonia with a cavitary component in the right  upper lobe. Not in a hospital admission.     Medication:        Allergies:  No Known Allergies    Immunizations:  Immunization History   Administered Date(s) Administered    COVID-19, PFIZER PURPLE top, DILUTE for use, (age 15 y+), 30mcg/0.3mL 05/07/2021, 05/28/2021    Influenza Virus Vaccine 12/18/2014    Influenza, FLUBLOK, (age 25 y+), PF, 0.5mL 10/18/2019    Influenza, Triv, 3 Years and older, IM (Afluria (5 yrs and older) 11/02/2017    Td, unspecified formulation 01/19/2012       Family history, past medical history, and  social  history  are  reviewed as  below. Past Medical  History:  Past Medical History:   Diagnosis Date    Cancer ([address])     lymphoma    Dysphagia     Esophageal cancer ([address]) 04/26/2023    squamous cell carcinoma    Hypertension     Neoplasm, soft palate     Weight loss        Past  Surgical History:  Past Surgical History:   Procedure Laterality Date    BRONCHOSCOPY N/A 11/24/2023    BRONCHOSCOPY DIAGNOSTIC OR CELL [address] ONLY performed by Juan Chairez MD at [address]  11/24/2023    BRONCHOSCOPY ALVEOLAR LAVAGE performed by Juan Chairez MD at [address]  11/24/2023    BRONCHOSCOPY THERAPUTIC ASPIRATION INITIAL performed by uJan Chairez MD at [address] [facility]      @ 10years old, led to Non-Hodgkin's diagnosis    ESOPHAGOGASTRODUODENOSCOPY      NECK LESION BIOPSY      PORT SURGERY N/A 5/3/2023    SURGICAL PORT PLACEMENT performed by Valley Closs, MD at [address] 5/3/2023    ROBOTIC [address] performed by Valley Closs, [place] at [address] 11/21/2023    EGD STENT PLACEMENT performed by Beverly Moise DO at [facility] ENDOSCOPY       Family  History:  Family History   Problem Relation Age of Onset    Cancer Father     Diabetes Father     High Blood Pressure Father        Social History:  Social History     Tobacco Use    Smoking status: Some Days     Packs/day: 0.20     Years: 15.00     Additional pack years: 0.00     Total pack years: 3.00     Types: Cigarettes    Smokeless tobacco: Never    Tobacco comments:      With anxiety   Vaping Use    Vaping Use: Never used   Substance Use Topics    Alcohol use: Not Currently    Drug use: No       ROS  Constitutional:  Denies fever,sweats, chills + weight loss  Eyes:  Denies change in visual acuity

## 2023-11-29 VITALS
HEIGHT: 74 IN | DIASTOLIC BLOOD PRESSURE: 73 MMHG | TEMPERATURE: 98.2 F | BODY MASS INDEX: 16.68 KG/M2 | OXYGEN SATURATION: 94 % | RESPIRATION RATE: 16 BRPM | HEART RATE: 110 BPM | SYSTOLIC BLOOD PRESSURE: 117 MMHG | WEIGHT: 130 LBS

## 2023-11-29 LAB
ANION GAP SERPL CALCULATED.3IONS-SCNC: 10 MMOL/L (ref 3–16)
BILIRUB UR QL STRIP.AUTO: NEGATIVE
BUN SERPL-MCNC: 7 MG/DL (ref 7–20)
CALCIUM SERPL-MCNC: 9.7 MG/DL (ref 8.3–10.6)
CHLORIDE SERPL-SCNC: 98 MMOL/L (ref 99–110)
CLARITY UR: CLEAR
CO2 SERPL-SCNC: 27 MMOL/L (ref 21–32)
COLOR UR: YELLOW
CREAT SERPL-MCNC: 0.6 MG/DL (ref 0.9–1.3)
DEPRECATED RDW RBC AUTO: 19.9 % (ref 12.4–15.4)
FERRITIN SERPL IA-MCNC: 1092 NG/ML (ref 30–400)
GFR SERPLBLD CREATININE-BSD FMLA CKD-EPI: >60 ML/MIN/{1.73_M2}
GLUCOSE SERPL-MCNC: 82 MG/DL (ref 70–99)
GLUCOSE UR STRIP.AUTO-MCNC: NEGATIVE MG/DL
HCT VFR BLD AUTO: 22.5 % (ref 40.5–52.5)
HGB BLD-MCNC: 7.2 G/DL (ref 13.5–17.5)
HGB UR QL STRIP.AUTO: NEGATIVE
IRON SERPL-MCNC: 41 UG/DL (ref 59–158)
KETONES UR STRIP.AUTO-MCNC: 15 MG/DL
LEUKOCYTE ESTERASE UR QL STRIP.AUTO: NEGATIVE
MAGNESIUM SERPL-MCNC: 1.7 MG/DL (ref 1.8–2.4)
MCH RBC QN AUTO: 29.9 PG (ref 26–34)
MCHC RBC AUTO-ENTMCNC: 31.9 G/DL (ref 31–36)
MCV RBC AUTO: 93.7 FL (ref 80–100)
NITRITE UR QL STRIP.AUTO: NEGATIVE
PH UR STRIP.AUTO: 6 [PH] (ref 5–8)
PHOSPHATE SERPL-MCNC: 2.3 MG/DL (ref 2.5–4.9)
PLATELET # BLD AUTO: 297 K/UL (ref 135–450)
PMV BLD AUTO: 7.7 FL (ref 5–10.5)
POTASSIUM SERPL-SCNC: 3.5 MMOL/L (ref 3.5–5.1)
PROT UR STRIP.AUTO-MCNC: NEGATIVE MG/DL
RBC # BLD AUTO: 2.4 M/UL (ref 4.2–5.9)
SODIUM SERPL-SCNC: 135 MMOL/L (ref 136–145)
SP GR UR STRIP.AUTO: >=1.03 (ref 1–1.03)
UA DIPSTICK W REFLEX MICRO PNL UR: ABNORMAL
URN SPEC COLLECT METH UR: ABNORMAL
UROBILINOGEN UR STRIP-ACNC: 0.2 E.U./DL
WBC # BLD AUTO: 8.8 K/UL (ref 4–11)

## 2023-11-29 PROCEDURE — 82728 ASSAY OF FERRITIN: CPT

## 2023-11-29 PROCEDURE — 81003 URINALYSIS AUTO W/O SCOPE: CPT

## 2023-11-29 PROCEDURE — 2500000003 HC RX 250 WO HCPCS: Performed by: STUDENT IN AN ORGANIZED HEALTH CARE EDUCATION/TRAINING PROGRAM

## 2023-11-29 PROCEDURE — 80048 BASIC METABOLIC PNL TOTAL CA: CPT

## 2023-11-29 PROCEDURE — 83540 ASSAY OF IRON: CPT

## 2023-11-29 PROCEDURE — 6360000002 HC RX W HCPCS: Performed by: STUDENT IN AN ORGANIZED HEALTH CARE EDUCATION/TRAINING PROGRAM

## 2023-11-29 PROCEDURE — 83735 ASSAY OF MAGNESIUM: CPT

## 2023-11-29 PROCEDURE — 87086 URINE CULTURE/COLONY COUNT: CPT

## 2023-11-29 PROCEDURE — 2580000003 HC RX 258: Performed by: STUDENT IN AN ORGANIZED HEALTH CARE EDUCATION/TRAINING PROGRAM

## 2023-11-29 PROCEDURE — 84100 ASSAY OF PHOSPHORUS: CPT

## 2023-11-29 PROCEDURE — 85027 COMPLETE CBC AUTOMATED: CPT

## 2023-11-29 RX ORDER — MAGNESIUM SULFATE IN WATER 40 MG/ML
2000 INJECTION, SOLUTION INTRAVENOUS ONCE
Status: COMPLETED | OUTPATIENT
Start: 2023-11-29 | End: 2023-11-29

## 2023-11-29 RX ADMIN — SODIUM CHLORIDE, POTASSIUM CHLORIDE, SODIUM LACTATE AND CALCIUM CHLORIDE: 600; 310; 30; 20 INJECTION, SOLUTION INTRAVENOUS at 06:22

## 2023-11-29 RX ADMIN — MAGNESIUM SULFATE HEPTAHYDRATE 2000 MG: 40 INJECTION, SOLUTION INTRAVENOUS at 09:31

## 2023-11-29 RX ADMIN — Medication 10 ML: at 09:32

## 2023-11-29 RX ADMIN — POTASSIUM PHOSPHATE, MONOBASIC POTASSIUM PHOSPHATE, DIBASIC 20 MMOL: 224; 236 INJECTION, SOLUTION, CONCENTRATE INTRAVENOUS at 11:40

## 2023-11-29 NOTE — PROGRESS NOTES
Pt arrived to room 326 from ER. Pt was brought up to room before report was given to this writer. This writer called ER a second time to get report on pt, after already attempting once to call and get report before pt was brought up.

## 2023-11-29 NOTE — PLAN OF CARE
Problem: Pain  Goal: Verbalizes/displays adequate comfort level or baseline comfort level  Outcome: Progressing  Flowsheets (Taken 11/28/2023 1954)  Verbalizes/displays adequate comfort level or baseline comfort level:   Encourage patient to monitor pain and request assistance   Assess pain using appropriate pain scale   Administer analgesics based on type and severity of pain and evaluate response   Implement non-pharmacological measures as appropriate and evaluate response     Problem: Safety - Adult  Goal: Free from fall injury  Outcome: Progressing  Flowsheets (Taken 11/28/2023 1954)  Free From Fall Injury:   Instruct family/caregiver on patient safety   Based on caregiver fall risk screen, instruct family/caregiver to ask for assistance with transferring infant if caregiver noted to have fall risk factors     Problem: Skin/Tissue Integrity  Goal: Absence of new skin breakdown  Description: 1. Monitor for areas of redness and/or skin breakdown  2. Assess vascular access sites hourly  3. Every 4-6 hours minimum:  Change oxygen saturation probe site  4. Every 4-6 hours:  If on nasal continuous positive airway pressure, respiratory therapy assess nares and determine need for appliance change or resting period.   Outcome: Progressing

## 2023-11-29 NOTE — CARE COORDINATION
Case Management Discharge Summary- Hospice Discharge    Destination:   Home with support of mother and [facility] name and contact: [facility] of [address]    Durable Equipment arrangements are completed by the Hospice nurse. [place] DNR signed and placed in discharge packet AND patient's hard chart. (This is required for DNR patients.)    Transport: via private car- mother     Notified Family: Mother and Patient in room     Patient's RN notified of plan: Rosie Singh RN    . LATOYA Hickey

## 2023-11-29 NOTE — CARE COORDINATION
Case Management Assessment  Initial Evaluation    Date/Time of Evaluation: 11/29/2023 10:20 AM  Assessment Completed by: LATOYA Polanco    If patient is discharged prior to next notation, then this note serves as note for discharge by case management. Patient Name: Bandar Guillen                   YOB: 1975  Diagnosis: Esophageal cancer ([address]) [C15.9]  Hemoptysis [R04.2]  Malignant neoplasm of esophagus, unspecified location [facility]) [C15.9]                   Date / Time: 11/28/2023 10:58 AM    Patient Admission Status: Inpatient   Readmission Risk (Low < 19, Mod (19-27), High > 27): Readmission Risk Score: 24.1    Current PCP: Su Fraire MD  PCP verified by CM? Yes    Chart Reviewed: Yes      History Provided by: Patient  Patient Orientation: Alert and Oriented, Person, Place, Situation, Self    Patient Cognition: Alert    Hospitalization in the last 30 days (Readmission):  Yes   If yes, Readmission Assessment in CM Navigator will be completed. Advance Directives:      Code Status: Limited   Patient's Primary Decision Maker is: Legal Next of Kin    Primary Decision Maker: Angelito Mcdowell - Parent - [phone]    Supplemental (Other) Decision Maker: Cookie Zarate - Other - [phone]    Discharge Planning:    Patient lives with: Alone Type of Home: House  Primary Care Giver: Self  Patient Support Systems include: Parent, Family Members   Current community resources: None  Current services prior to admission: Home Infusion ([facility] for tube feeds)            Type of Home Care services:  None    ADLS  Prior functional level: Independent in ADLs/IADLs  Current functional level: Independent in ADLs/IADLs    Family can provide assistance at DC: Yes  Would you like Case Management to discuss the discharge plan with any other family members/significant others, and if so, who?  Yes Purvi Ashley Mother)  Plans to Return to Present Housing: Yes  Patient expects to discharge to:

## 2023-11-29 NOTE — ACP (ADVANCE CARE PLANNING)
Advance Care Planning     Advance Care Planning Inpatient Note  [facility] Department    Today's Date: 2023  Unit: [place] C3 TELE/MED SURG/ONC    Received request from IDT Member. Upon review of chart and communication with care team, patient's decision making abilities are not in question. . Patient and girlfriend and mother  was/were present in the room during visit. Goals of ACP Conversation:  Discuss advance care planning documents    Health Care Decision Makers:       Primary Decision Maker: Marco A Ernandez - Parent - [phone]  Summary:  [address]  Documented Next of Kin, per patient report    Advance Care Planning Documents (Patient Wishes):  None     Assessment:  Patient has cancer diagnosis and decided to enter home hospice. He states he has not talked to his family about his wishes. Mother states they used hospice when her   and they feel the experience was positive. Interventions:  Provided education on documents for clarity and greater understanding  Discussed and provided education on state decision maker hierarchy. In the patient's case, he has no legal spouse or adult children. He has one living parent. His mother is the legal decision maker in this case. He states this is who he would \"likely\" pick.  noted his mother would be his legal decision if he is incapacitated unless he completes a legal document stating otherwise. Care Preferences Communicated:   Ventilation:   If the patient, in their present state of health, suddenly became very ill and unable to breathe on their own,     the patient would NOT desire the use of a ventilator (breathing machine). If their health worsens and it becomes clear that the change of recovery is unlikely,     the patient would NOT desire the use of a ventilator (breathing machine).     Resuscitation:  In the event the patient's heart stopped as a result of an

## 2023-11-29 NOTE — PROGRESS NOTES
Discharge: Pt taken out by wheelchair by PCA to be discharged to home with [facility].

## 2023-11-29 NOTE — CONSULTS
PALLIATIVE MEDICINE CONSULTATION     Patient name:Jostin Schmitz   IDI:[id number]    :1975  Room/Bed:0326/0326-01   LOS: 1 day         Date of consult:2023    Inpatient consult to Palliative Care  Consult performed by: LEONARDO Dye CNP  Consult ordered by: Alden Wood MD      Inpatient consult to Palliative Care  Consult performed by: LEONARDO Dye CNP  Consult ordered by: ROSA Hernandez              ASSESSMENT/RECOMMENDATIONS     46 y.o. male with esophageal cancer admitted with hemoptysis      Symptom Management:  Goals of Care- Likely comfort care with hospice at home. Patient and mother would like to use [facility]. Referral sent and hospice meeting time TBD. In the meantime, treat what can be reasonably treated, including blood products if symptomatic. Code status is Limited x 4 nos. Anticipate transition to DNR CC once patient confirms hospice enrollment. Will follow    Cancer - Hodgkin's disease in , Non-hodgkin's lymphoma in , carcinoma of the soft palate in , esophageal cancer  in . PET/CT findings 2023 concerning for progression specifically in the lungs. Oncology consulted. Concerns exist that patient may not respond to more chemotherapy and that treatment might make him feel worse than the cancer. Patient is in agreement that he does not want more treatment for fear of SE    Malnutrition - BMI 16. Albumin 2.4, total protein 8.4.  . Patient has had a 20% weight loss since 2023. Unable to take PO since April and relies on feeding tube      Patient/Family Goals of Care :    23    Spoke with patient at the bedside. His mother and girlfriend are also at the bedside. Patient is alert and oriented and decisional.  He denies pain. He does not appear to be in any distress. Explained role of palliative care.   Patient agreeable to discussion with me today and indicates he would like his

## 2023-11-29 NOTE — PROGRESS NOTES
Pt a/o. VSS. Oriented to room. Blood cultures were collected, as they were not completed in the ER. Levaquin started once blood cultures were completed.

## 2023-11-29 NOTE — PLAN OF CARE
Problem: Safety - Adult  Goal: Free from fall injury  Outcome: Progressing  Flowsheets (Taken 11/28/2023 1954 by Myriam Mcadams RN)  Free From Fall Injury:   Instruct family/caregiver on patient safety   Based on caregiver fall risk screen, instruct family/caregiver to ask for assistance with transferring infant if caregiver noted to have fall risk factors

## 2023-11-29 NOTE — ADT AUTH CERT
non-distended. J tube present; + J tube with TF running. Extremities: no edema   Musculoskeletal:  No edema  Neurologic:  Non-focal  Psychiatric:  Alert and oriented     MEDICATIONS:  Mag 2000mg iv x1  KCL 10meq iv x4  Duflucan iv q24hrs  Zosyn iv q8hrs  Lovenox 40mg sc qd     ORDERS:  Continue TF via j tube. Okay to suck on ice chips. NPO pending goals of care.       npo  ADULT TUBE FEEDING; Jejunostomy; Standard with Fiber; Continuous; 15; Yes; 15; Q 4 hours; 65; 60; Q 4 hours        PT/OT/SLP/CM/RN ASSESSMENT OR NOTES:  Dc planning for home when medically stable  Pt has hhc for TF at home         11/24 by Aristeo Perez RN  Last Updated by Aristeo Perez RN on 11/28/2023 2057     Review Status Created By   In Sandra Leblanc RN       Review Type   Continued Stay      Criteria Review   DATE: 11/24     PERTINENT UPDATES:  Per Pulmonary:  Bronchoscopy today:  patient was found to have slight thinning of the right vocal cord along with that patient also has some petechial hemorrhaging on the vocal cords, patient had minimal adduction deformity of the vocal cords, on advancing the bronchoscope further patient was found to have thick mucous plugs in the trachea which were suctioned out, patient also had entire right tracheobronchial tree was full of thick mucous plugs along with left lower lobe which was also thick mucous plugs which were purulent in nature along with that patient had some food particles in the right lower lobe, patient was also found to have a nodular lesion near the right upper lobe bronchus which was somewhat friable, the bronchoscope was wedged into the right upper lobe bronchus and BAL was done from the area which was sent for various culture and cytology along with cell count, the rest of the tracheobronchial tree was therapeutically aspirated using Mucomyst and saline with improvement in the patency of the airways; nodular lesion was not biopsied not knowing the anatomy of the patient

## 2023-11-29 NOTE — CONSULTS
Comprehensive Nutrition Assessment    Type and Reason for Visit:  Initial, Positive Nutrition Screen, Consult    Nutrition Recommendations/Plan:   NPO  Recommend order \"Diet: Adult Tube Feed\". Initiate nocturnal feeds of jevity 1.5 (standard with fiber formula) at goal of 90 mL/hr via J-tube x14 hrs (6pm-8am). Recommend 40 mL H20 q 1 hours. Recommend reevaluate IV fluids at this time. Increase flush if Na+ increases greater than 145 mEq/L. Monitor for tolerance (bowel habits, N/V, cramping, abdominal exam findings: distended, firm, tense, guarded, discomfort). Obtain updated weight  Monitor nutrition adequacy, pertinent labs, bowel habits, wt changes, and clinical progress     Malnutrition Assessment:  Malnutrition Status: At risk for malnutrition (Comment) (11/29/23 1212)    Context:  Chronic Illness     Findings of the 6 clinical characteristics of malnutrition:  Energy Intake:  Mild decrease in energy intake (Comment)  Weight Loss:  Greater than 10% over 6 months       Nutrition Assessment:    Consult for TF order and management/+IP for home TF, weight loss/appetite: 45 yo M w pmh lymphoma, esophageal cancer with recent chemotherapy, dysphagia, J-tube, HTN p/w hemoptysis. Recently d/[facility] 11/27, previously on continuous feeds with goal of 65 ml/hr. Current weight stated, RD to order updated weight. Palliative care consulted. Pt home TF regimen is nocturnal feeds of osmolite at 100 ml/hr x11 hrs with 40 ml water flushes q 1 hrs. NPO by mouth. Pt plans to resume nocturnal TF at d/c. Reports previously tolerating TFs, denies N/V/abdominal pain, abnormal bowel movements. Discussed increasing TF to better meet kcal, protein needs and weight loss prevention, pt agreeable. J-tube in place. TF recommendations provided. Will monitor. Nutrition Related Findings:    J-tube. Active BS. BG  x24 hrs. Na 135. Mg 1.7. Phos 2.3.  Wound Type: Pressure Injury       Current Nutrition Intake & Therapies:    Average

## 2023-11-29 NOTE — PROGRESS NOTES
Received patient awake in bed, alert and oriented/conversant. Vital signs taken and recorded. Noted with intact and patent Single lumen L subclavian chest port - hooked with LR at 100 cc/hr. On RA and no signs of SOB. Abdomen soft  and non distended; noted with intact and clamped PEG tube - no dressing and JEANETTE. Skin assessment done. Dry wound on R coccyx area. Bed wheels locked; Call light within reached; Raised siderails x2; on Fall precaution. Denies any need at the moment.

## 2023-11-29 NOTE — DISCHARGE INSTR - DIET

## 2023-11-29 NOTE — ACP (ADVANCE CARE PLANNING)
Advance Care Planning     General Advance Care Planning (ACP) Conversation    Date of Conversation: 11/28/2023  Conducted with: Patient with Decision Making Capacity    Healthcare Decision Maker:    Primary Decision Maker: Marco A Ernandez [facility] - [phone]    Supplemental (Other) Decision Maker: Cookie Zarate - Other - [phone]  Click here to complete Healthcare Decision Makers including selection of the Healthcare Decision Maker Relationship (ie \"Primary\"). Today we discussed Healthcare Decision Makers. The patient is considering options.     Content/Action Overview:  Has NO ACP documents/care preferences - refer to ACP Clinical Specialist  Reviewed DNR/DNI and patient   Length of Voluntary ACP Conversation in minutes:  <16 minutes (Non-Billable)    LATOYA Taylor

## 2023-11-29 NOTE — PROGRESS NOTES
control, iterative reconstruction, and/or weight based adjustment of the mA/kV was utilized to reduce the radiation dose to as low as reasonably achievable. COMPARISON: 11/24/2023. HISTORY: ORDERING SYSTEM PROVIDED HISTORY: evaluate for esophageal rupture TECHNOLOGIST PROVIDED HISTORY: Add oral contrast please Reason for exam:->evaluate for esophageal rupture Decision Support Exception - unselect if not a suspected or confirmed emergency medical condition->Emergency Medical Condition (MA) Reason for Exam: started spitting up blood last night. pt was discharged yesterday . pt had a tear in esophagus with stent placed. Relevant Medical/Surgical History: hodgkins lymphoma, mouth ca, esphogeal ca. , FINDINGS: Mediastinum: Left thyroid nodule measuring 1.8 cm on series 2, image 15. Moderate coronary artery calcification. Heart size is normal.  Trace pericardial fluid, as with prior. Similar scattered subcentimeter and pericentimeter short axis mediastinal lymph nodes. Similar position esophageal stent. No new pneumomediastinum or mediastinal fluid to suggest esophageal perforation. Lungs/pleura: Similar small right pleural effusion. Similar consolidation, ground-glass and cavitary change of the right upper lobe. Overall new and increased ground-glass centrilobular nodularity in the left lung. Similar centrilobular nodularity in the right lung. Upper Abdomen: Unremarkable. Soft Tissues/Bones: Similar sclerosis of the T3 vertebral body. No acute bone or soft tissue abnormality. 1. No evidence of esophageal perforation by noncontrast technique. 2. Overall worsening bronchopneumonia with a cavitary component in the right upper lobe. 3. Left thyroid nodule measuring 1.8 cm. Recommendation below. 4. Findings otherwise stable. RECOMMENDATIONS: 1.8 cm incidental left thyroid nodule. Recommend non-emergent thyroid ultrasound. Reference: J Am Ayden Radiol.  2015 Feb;12(2): 143-50     XR CHEST (2 VW)    Result Date: 11/28/2023  EXAMINATION: TWO XRAY VIEWS OF THE CHEST 11/28/2023 11:51 am COMPARISON: 11/22/2023 HISTORY: ORDERING SYSTEM PROVIDED HISTORY: hemoptysis TECHNOLOGIST PROVIDED HISTORY: Reason for exam:->hemoptysis Reason for Exam: Hemoptysis FINDINGS: Left-sided central venous catheter remains in place. Heart size stable. Decreased but persistent right upper lobe consolidation. Right pleural effusion. No pneumothorax.      Decreased but persistent right upper lobe consolidation with cavitation suggesting persistent pneumonia       Electronically signed by Johnson Rosales MD on 11/29/2023 at 10:41 AM

## 2023-11-29 NOTE — PROGRESS NOTES
7.2 6.6 6.4  --  6.7   INR  --   --   --   --  1.20*  --      PTT:  No results for input(s): \"APTT\" in the last 720 hours. CMP:    Recent Labs     11/29/23  0615 11/28/23  1144 11/27/23  0530 11/26/23  0520 11/25/23  0605 11/24/23  0636 11/23/23  0640   * 135* 133* 134* 136 139 136   K 3.5 3.6 3.9 3.7 3.2* 3.1* 3.3*   CL 98* 95* 97* 99 99 101 100   CO2 27 26 27 28 30 27 25   GLUCOSE 82 112* 83 119* 139* 102* 93   BUN 7 8 6* 7 7 7 9   CREATININE 0.6* 0.6* 0.6* <0.5* 0.6* <0.5* <0.5*   LABGLOM >60 >60 >60 >60 >60 >60 >60   CALCIUM 9.7 10.2 9.3 8.0* 8.1* 8.2* 8.1*   PROT  --  8.4* 7.2 6.6 6.4 6.7 6.3*   LABALBU  --  2.4* 1.9* 1.8* 1.7* 2.0* 1.8*   AGRATIO  --  0.4* 0.4* 0.4* 0.4* 0.4* 0.4*   BILITOT  --  0.5 0.4 0.3 0.3 0.5 0.5   ALKPHOS  --  109 107 120 118 104 98   ALT  --  12 12 12 10 9* 9*   AST  --  21 22 21 17 17 14*   MG 1.70*  --   --   --  1.70* 1.80 1.70*       Lab Results   Component Value Date    CALCIUM 9.7 11/29/2023    PHOS 2.3 (L) 11/29/2023       LDH:No results for input(s): \"LDH\" in the last 720 hours. Radiology Review:  CT CHEST WO CONTRAST  Narrative: EXAMINATION:  CT OF THE CHEST WITHOUT CONTRAST 11/28/2023 12:30 pm    TECHNIQUE:  CT of the chest was performed without the administration of intravenous  contrast. Multiplanar reformatted images are provided for review. Automated  exposure control, iterative reconstruction, and/or weight based adjustment of  the mA/kV was utilized to reduce the radiation dose to as low as reasonably  achievable. COMPARISON:  11/24/2023. HISTORY:  ORDERING SYSTEM PROVIDED HISTORY: evaluate for esophageal rupture  TECHNOLOGIST PROVIDED HISTORY:  Add oral contrast please  Reason for exam:->evaluate for esophageal rupture  Decision Support Exception - unselect if not a suspected or confirmed  emergency medical condition->Emergency Medical Condition (MA)  Reason for Exam: started spitting up blood last night. pt was discharged  yesterday .  pt had a tear nodularity in the left lung and similar centrilobular nodularity in the right lung.   - unclear origin could be esophageal mass, esophagus, lung mets, etc.   - Hemoglobin is 7.2 today which is diminished.  -Will continue to follow and treat as needed. -Iron studies are sent. - could consider pulmonology consult for repeat bronchoscopy but would not fix the underlying etiology   -I am very concerned that he has a high risk of exsanguinating and there is not much that can be done about this. The patient is aware.      Esophageal perforation:  -Status post stent procedure by GI with metal stent 11/21 for perf at 28-30cm with malignant mass adjacent      Pneumonia  - CT 11/24 showed cavitary lesion in RUL with presumed pneumonia   - s/p bronchoscopy 11/24  - cytology 11/24 negative for malignancy, positive for severe acute inflammation   - respiratory culture positive for Klebsiella pneumonia  - blood cultures NGTD   - IR consulted to place chest tube but felt to be risky due to possible bronchopleural fistula formation, decided not to proceed with chest tube placement at this time   - was discharged on levaquin      Nutrition:  - has J tube   - intermittently clogged which he clears with coke      Anemia:  -Hemoglobin is down to 7.2 from 8.5 yesterday  -Will continue to monitor  -Will continue on B12 as scheduled  -Iron studies are pending  -See above     Hodgkin's disease:  -1998  -Status post MOPP/ABVD  -No evidence of recurrence     Non-Hodgkin's lymphoma:  -9/22/22009  -Treated with Rituxan/ice  -Radiation could not be used due to previous Hodgkin's disease  -It was not feasible to use Adriamycin due to his previous Adriamycin doses     Carcinoma of the soft palate:  -Diagnosis 12/27/2016  -HPV negative  -T2, N0, M0  -Status post every 3-week cisplatin and concurrent radiation          ONCOLOGIC DISPOSITION:      Aditi Tavarez MD  Please contact through [facility]

## 2023-11-30 LAB — BACTERIA UR CULT: NORMAL

## 2023-12-02 LAB
BACTERIA BLD CULT ORG #2: NORMAL
BACTERIA BLD CULT: NORMAL

## 2023-12-02 NOTE — DISCHARGE SUMMARY
V2.0  Discharge Summary    Name:  Skyla Francois /Age/Sex: 1975 (46 y.o. male)   Admit Date: 2023  Discharge Date: 23    MRN & CSN:  [id number] & [id number] Encounter Date and Time 23 12:48 PM EST    Attending:  No att. providers found Discharging Provider: Matt Latham MD       Hospital Course:     Brief HPI: Skyla Francois is a 46 y.o. male with a pmh of esophageal cancer metastatic to the lungs suspected with recent bronchoscopy who presents with hemoptysis     Hospital Problems               Last Modified POA     * (Principal) Esophageal cancer ([address]) 2023 Yes         Hemoptysis in the setting of underlying worsening metastatic resulting stage IV esophageal cancer: Patient was receiving chemotherapy cycle #4. Repeat CT showed dominant cavitary focus on right upper lobe. This was presumed to be a cavitary pneumonia but could also has not cavitary metastasis. Heme and oncology was following and will follow-up outpatient . Started on levaquin. Discussed with family no intervention plans. Discussed with oncology and GI, CODE STATUS changed to LIMITED code and palliative consult in place for goals of care. Patient considering hospice. No changes since last seen on 2022. All questions and concerns addressed. Patient is understanding about hospice but believes there to be more time to talk to family. Patient discharged home with hospice  H/o esophageal perforations/p esophageal stent. GI on board. Previous h/o consideration of chest tube but not placed due to high risk of perforation. Oropharyngeal dysphagia: has feeding J tube nutrition consulted advance tube feed hydration. Benign Essential HTN:   Anemia of chronic disease    The patient expressed appropriate understanding of, and agreement with the discharge recommendations, medications, and plan.      Consults this admission:  IP CONSULT TO GI  IP CONSULT TO ONCOLOGY  IP CONSULT TO HOSPITALIST  IP CONSULT TO No results for input(s): \"PROBNP\" in the last 72 hours. UA:  Lab Results   Component Value Date/Time    NITRU Negative 11/29/2023 09:40 AM    COLORU Yellow 11/29/2023 09:40 AM    PHUR 6.0 11/29/2023 09:40 AM    CLARITYU Clear 11/29/2023 09:40 AM    SPECGRAV >=1.030 11/29/2023 09:40 AM    LEUKOCYTESUR Negative 11/29/2023 09:40 AM    UROBILINOGEN 0.2 11/29/2023 09:40 AM    BILIRUBINUR Negative 11/29/2023 09:40 AM    BLOODU Negative 11/29/2023 09:40 AM    GLUCOSEU Negative 11/29/2023 09:40 AM    KETUA 15 11/29/2023 09:40 AM     Urine Cultures:   Lab Results   Component Value Date/Time    LABURIN No growth at 18 to 36 hours 11/29/2023 09:40 AM     Blood Cultures:   Lab Results   Component Value Date/Time    [facility]  11/28/2023 06:32 PM     No Growth to date. Any change in status will be called. Lab Results   Component Value Date/Time    BLOODCULT2  11/28/2023 06:41 PM     No Growth to date. Any change in status will be called.      Organism:   Lab Results   Component Value Date/Time    [facility] Klebsiella oxytoca 11/21/2023 06:15 AM       Time Spent Discharging patient 35 minutes    Electronically signed by Matt Latham MD on 12/2/2023 at 12:48 PM Initial (On Arrival)

## 2023-12-03 NOTE — ED PROVIDER NOTES
[facility] Emergency Department    CHIEF COMPLAINT  Hemoptysis (Pt reporting he was admitted to the hospital on 11/20 discharged yesterday and was treated for a tear in his esophagus. Reports he started coughing up blood this morning.  )      SHARED SERVICE VISIT  I have seen and evaluated this patient with my supervising physician, Dr. Alyssa Prieto. HISTORY OF PRESENT ILLNESS  Shila Martinez is a 46 y.o. male who presents to the ED complaining of hemoptysis. He was recently admitted to the hospital on 20 November due to hemoptysis there was discovery of esophageal perforation with aspiration pneumonia. Perforation was fixed and patient was discharged yesterday. He says last night around midnight he began coughing up blood and has been coughing up bright red blood since then. He is also extremely weak and fatigued. Denies any headache, body ache, fevers or chills. Denies any sneezing, sore throat, or vision changes. Denies any dizziness. Denies any actual chest pain or shortness of breath. Denies any nausea, vomiting, or abdominal pain. Denies any urinary symptoms. Denies any diarrhea or bloody stools. Denies any new onset back pain. Denies any recent travel or sick contacts. No other complaints, modifying factors or associated symptoms. Nursing notes reviewed.    Past Medical History:   Diagnosis Date    Cancer [facility])     lymphoma    Dysphagia     Esophageal cancer ([address] [facility]) 04/26/2023    squamous cell carcinoma    Hypertension     Neoplasm, soft palate     Weight loss      Past Surgical History:   Procedure Laterality Date    BRONCHOSCOPY N/A 11/24/2023    BRONCHOSCOPY DIAGNOSTIC OR CELL [address] ONLY performed by Kentrell Senior MD at [facility]  11/24/2023    BRONCHOSCOPY ALVEOLAR LAVAGE performed by Kentrell Senior MD at [facility]  11/24/2023    BRONCHOSCOPY THERAPUTIC ASPIRATION INITIAL performed by Kentrell Senior MD at

## 2023-12-05 LAB
ACID FAST STN SPEC QL: NORMAL
MYCOBACTERIUM SPEC CULT: NORMAL

## 2023-12-07 LAB
FUNGUS SPEC CULT: ABNORMAL
LOEFFLER MB STN SPEC: ABNORMAL
ORGANISM: ABNORMAL

## 2023-12-12 LAB
ACID FAST STN SPEC QL: NORMAL
MYCOBACTERIUM SPEC CULT: NORMAL

## 2023-12-26 LAB
ACID FAST STN SPEC QL: NORMAL
FUNGUS SPEC CULT: ABNORMAL
LOEFFLER MB STN SPEC: ABNORMAL
MYCOBACTERIUM SPEC CULT: NORMAL
ORGANISM: ABNORMAL

## 2024-01-02 LAB
ACID FAST STN SPEC QL: NORMAL
MYCOBACTERIUM SPEC CULT: NORMAL

## 2024-01-09 LAB
ACID FAST STN SPEC QL: NORMAL
MYCOBACTERIUM SPEC CULT: NORMAL

## 2024-01-18 ENCOUNTER — TELEPHONE (OUTPATIENT)
Dept: SURGERY | Age: 49
End: 2024-01-18

## 2024-01-18 NOTE — TELEPHONE ENCOUNTER
Pt's lavern Irving) called and said that his Jtube has been clogged since last night 1/17. Dr. Ellis had told them that if it was to clog to call and get him in with any of our Dr.'s. I informed her that there were no Dr's in office today and Dr. Dc is only in until 10am today at [facility]. We informed her to take him to the ED but she did say that he probably wouldn't want to go to the ED and was upset (not mad) because getting his feed is critical. He has Hospice care also. I scheduled him for tomorrow 1/19 with Dr. Sheffield and she told me that she would try to get him to go today to the ED but just in case he didn't to keep his appt for tomorrow. She is going to let us know what the outcome is today. Thank you!

## 2024-01-24 NOTE — TELEPHONE ENCOUNTER
I had canceled pt's appt on 1/19 with Dr. Sheffield due to getting the jtube unclogged, so no need to come in.

## 2024-01-25 ENCOUNTER — TELEPHONE (OUTPATIENT)
Dept: SURGERY | Age: 49
End: 2024-01-25

## 2024-01-25 NOTE — TELEPHONE ENCOUNTER
Pt's lavern (Cookie) called and said that she accidentally ruptured the intake part of his feed tube last night. She fixed it to where he can still get feed but said it probably still needs to be replaced. I did go ahead and schedule him with Dr. Ellis Monday 1/29, and just wanted to let this be known in case the Dr's think he needs to come in sooner. She didn't think it is an emergency at this point. Thank you!

## 2024-01-29 ENCOUNTER — OFFICE VISIT (OUTPATIENT)
Dept: SURGERY | Age: 49
End: 2024-01-29
Payer: COMMERCIAL

## 2024-01-29 VITALS
HEIGHT: 74 IN | SYSTOLIC BLOOD PRESSURE: 110 MMHG | WEIGHT: 122.2 LBS | BODY MASS INDEX: 15.68 KG/M2 | DIASTOLIC BLOOD PRESSURE: 78 MMHG

## 2024-01-29 DIAGNOSIS — T85.598A FEEDING TUBE DYSFUNCTION, INITIAL ENCOUNTER: Primary | ICD-10-CM

## 2024-01-29 PROCEDURE — 3078F DIAST BP <80 MM HG: CPT | Performed by: SURGERY

## 2024-01-29 PROCEDURE — 99211 OFF/OP EST MAY X REQ PHY/QHP: CPT | Performed by: SURGERY

## 2024-01-29 PROCEDURE — 3074F SYST BP LT 130 MM HG: CPT | Performed by: SURGERY

## 2024-01-29 NOTE — PROGRESS NOTES
Patient with end of feeding tube cracked.  Fiance cut some off and working fine.   On exam then end is lightly shorter but as long as connector to tube feeds is still fitting then don't need to replace.    Jaret Ellis MD

## (undated) DEVICE — CANNULA SEAL

## (undated) DEVICE — ARM DRAPE

## (undated) DEVICE — TROCAR: Brand: KII FIOS FIRST ENTRY

## (undated) DEVICE — Device

## (undated) DEVICE — ELECTRODE,ECG,STRESS,FOAM,3PK: Brand: MEDLINE

## (undated) DEVICE — SUTURE VCRL + SZ 0 L27IN ABSRB VLT L26MM UR-6 5/8 CIR VCP603H

## (undated) DEVICE — 3M™ TEGADERM™ TRANSPARENT FILM DRESSING FRAME STYLE WITH BORDER, 1616, 4 IN X 4-3/4 IN (10 CM X 12 CM), 50/CT 4CT/CASE: Brand: 3M™ TEGADERM™

## (undated) DEVICE — GLOVE SURG SZ 7 L12IN FNGR THK75MIL WHT LTX POLYMER BEAD

## (undated) DEVICE — TUBING, SUCTION, 3/16" X 6', STRAIGHT: Brand: MEDLINE

## (undated) DEVICE — GOWN SIRUS NONREIN XL W/TWL: Brand: MEDLINE INDUSTRIES, INC.

## (undated) DEVICE — SYRINGE MED 10ML SLIP TIP BLNT FILL AND LUERLOCK DISP

## (undated) DEVICE — PROVE COVER: Brand: UNBRANDED

## (undated) DEVICE — ENDOSCOPIC KIT 2 12 FT OP4 DE2 GWN SYR

## (undated) DEVICE — SUTURE PERMA-HAND SZ 2-0 L30IN NONABSORBABLE BLK L26MM SH K833H

## (undated) DEVICE — PENCIL ES CRD L10FT HND SWCHING ROCK SWCH W/ EDGE COAT BLDE

## (undated) DEVICE — SUTURE VCRL + SZ 3-0 L18IN ABSRB UD SH 1/2 CIR TAPERCUT NDL VCP864D

## (undated) DEVICE — SUTURE MCRYL + SZ 4-0 L18IN ABSRB UD L19MM PS-2 3/8 CIR MCP496G

## (undated) DEVICE — ELECTRODE,RADIOTRANSLUCENT,FOAM,3PK: Brand: MEDLINE

## (undated) DEVICE — SYRINGE MED 50ML LUERSLIP TIP

## (undated) DEVICE — COLUMN DRAPE

## (undated) DEVICE — SINGLE USE SUCTION VALVE MAJ-209: Brand: SINGLE USE SUCTION VALVE (STERILE)

## (undated) DEVICE — CANNULA,OXY,ADULT,SUPERSOFT,W/7'TUB,SC: Brand: MEDLINE INDUSTRIES, INC.

## (undated) DEVICE — SINGLE-USE BIOPSY FORCEPS: Brand: RADIAL JAW 4

## (undated) DEVICE — ADHESIVE SKIN CLSR 0.7ML TOP DERMBND ADV

## (undated) DEVICE — INTENDED FOR TISSUE SEPARATION, AND OTHER PROCEDURES THAT REQUIRE A SHARP SURGICAL BLADE TO PUNCTURE OR CUT.: Brand: BARD-PARKER ® STAINLESS STEEL BLADES

## (undated) DEVICE — SUTURE PROL 2-0 L48IN NONABSORBABLE BLU SH L26MM 1/2 CIR 8533H

## (undated) DEVICE — ELECTRO LUBE IS A SINGLE PATIENT USE DEVICE THAT IS INTENDED TO BE USED ON ELECTROSURGICAL ELECTRODES TO REDUCE STICKING.: Brand: KEY SURGICAL ELECTRO LUBE

## (undated) DEVICE — REDUCER: Brand: ENDOWRIST

## (undated) DEVICE — DRAPE C ARM W46XL120IN XLN

## (undated) DEVICE — BOWL MED M 16OZ PLAS CAP GRAD

## (undated) DEVICE — DRAPE,T,LAPARO,TRANS,STERILE: Brand: MEDLINE

## (undated) DEVICE — SUTURE VCRL SZ 3-0 L18IN ABSRB UD L26MM SH 1/2 CIR J864D

## (undated) DEVICE — CATHETER,URETHRAL,REDRUBBER,STRL,14FR: Brand: MEDLINE INDUSTRIES, INC.

## (undated) DEVICE — TUBING, SUCTION, 3/16" X 12', STRAIGHT: Brand: MEDLINE

## (undated) DEVICE — YANKAUER,BULB TIP,W/O VENT,RIGID,STERILE: Brand: MEDLINE

## (undated) DEVICE — SUTURE PERMAHAND SZ 3-0 L30IN NONABSORBABLE BLK SH L26MM K832H

## (undated) DEVICE — CONMED SCOPE SAVER BITE BLOCK, 20X27 MM: Brand: SCOPE SAVER

## (undated) DEVICE — TRAP,MUCUS SPECIMEN, 80CC: Brand: MEDLINE

## (undated) DEVICE — GLOVE,SURG,SENSICARE SLT,LF,PF,7: Brand: MEDLINE

## (undated) DEVICE — HIGH PERFORMANCE GUIDEWIRE: Brand: DREAMWIRE

## (undated) DEVICE — SINGLE USE BIOPSY VALVE MAJ-210: Brand: SINGLE USE BIOPSY VALVE (STERILE)

## (undated) DEVICE — MINOR SET UP: Brand: MEDLINE INDUSTRIES, INC.

## (undated) DEVICE — TIP COVER ACCESSORY

## (undated) DEVICE — BLADELESS OBTURATOR: Brand: WECK VISTA

## (undated) DEVICE — RESCUE COMBO FORCEPS

## (undated) DEVICE — CANNULA NSL AD TBNG L7FT PVC STR NONFLARED PRNG O2 DEL W STD

## (undated) DEVICE — LINER,SOFT,SUCTION CANISTER,1500CC: Brand: MEDLINE